# Patient Record
Sex: MALE | Race: WHITE | NOT HISPANIC OR LATINO | Employment: STUDENT | URBAN - METROPOLITAN AREA
[De-identification: names, ages, dates, MRNs, and addresses within clinical notes are randomized per-mention and may not be internally consistent; named-entity substitution may affect disease eponyms.]

---
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
## 2020-02-19 NOTE — PATIENT INSTRUCTIONS
1  Continue PT per protocol  2   Follow-up in 2 months with Dr Vyas Pi 1. Does the patient have a moderate to severe fever?  No  2. Has the patient had a serious reaction to a flu shot before?   No  3. Has the patient ever had Guillian [place]field Syndrome within 6 weeks of a previous flu shot?  No  4. Is the patient less than 6 months of age?  No    Patient is eligible to receive the vaccine based on all questions being answered as 'No'.Recent PHQ 2/9 Score    PHQ 2:  Date Adult PHQ 2 Score   2/19/2020 1       PHQ 9:  Date Adult PHQ 9 Score   2/19/2020 4     Over the last 2 weeks, how often have you been bothered by the following problems?          PHQ2 Score:  1  1. Little interest or pleasure in doing things?:  1  2. Feeling down, depressed, or hopeless?:  0     PHQ9 Score:  4  3. Trouble falling, staying asleep or sleeping too much?:  2  4. Feeling tired or having little energy?:  1  5. Poor appetite or overeating?:  0  6. Feeling bad about yourself - or that you are a failure or that you have let yourself or your family down?:  0  7. Trouble concentrating on things such as reading a newspaper or watching television?:  0  8. Moving or speaking so slowly that other people could have noticed? Or the opposite - being so fidgety or restless that you were moving around a lot more than usual?:  0  9. Thoughts that you would be better off dead, or of hurting yourself in some way?:  0

## 2020-02-20 ENCOUNTER — EVALUATION (OUTPATIENT)
Dept: PHYSICAL THERAPY | Facility: REHABILITATION | Age: 20
End: 2020-02-20
Payer: COMMERCIAL

## 2020-02-20 DIAGNOSIS — M25.821 OLECRANON IMPINGEMENT SYNDROME OF RIGHT ELBOW: Primary | ICD-10-CM

## 2020-02-20 PROCEDURE — 97140 MANUAL THERAPY 1/> REGIONS: CPT | Performed by: PHYSICAL THERAPIST

## 2020-02-20 PROCEDURE — 97161 PT EVAL LOW COMPLEX 20 MIN: CPT | Performed by: PHYSICAL THERAPIST

## 2020-02-20 NOTE — LETTER
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        Dear Dr Shaina Cantu:    Thank you for your recent referral of James Saab  Please review the attached evaluation summary from Zain's recent visit  Please verify that you agree with the plan of care by signing the attached order  If you have any questions or concerns, please do not hesitate to call  I sincerely appreciate the opportunity to share in the care of one of your patients and hope to have another opportunity to work with you in the near future  Sincerely,    Beulah Marino, PT      Referring Provider:      I certify that I have read the below Plan of Care and certify the need for these services furnished under this plan of treatment while under my care  Alexis Rausch MD  [address]  [address]: [phone]          PT Evaluation     Today's date: 2020  Patient name: Stu Interiano  : 2000  MRN: [id number]  Referring provider: Romaine Warren MD  Dx:   Encounter Diagnosis     ICD-10-CM    1  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        Start Time: 1805  Stop Time: 1900  Total time in clinic (min): 55 minutes    Assessment  Assessment details: Stu Interiano is a 23 y o  male presenting to outpatient physical therapy on 20 with referral from MD for right posterior elbow impingement felt when throwing (release phase)  Upon evaluation, James Marmolejo demonstrates impaired pronator teres tone and TTP along with weakness in muscle, especially when tested overhead  The listed impairments and functional limitation are effecting James Marmolejo ability to function at prior level   They can continue to benefit from physical therapy services at this times in order to address the above discussed impairments and functional limitation in order to allow for a return to premorbid status  Provided HEP with pronator strengthening at this time  Will attempt throwing in ~ 10 days  Impairments: abnormal muscle firing, abnormal muscle tone, activity intolerance, impaired physical strength and pain with function  Understanding of Dx/Px/POC: good   Prognosis: good    Plan  Patient would benefit from: skilled PT  Planned modality interventions: thermotherapy: hydrocollator packs  Planned therapy interventions: joint mobilization, manual therapy, neuromuscular re-education, home exercise program, therapeutic exercise, therapeutic activities, strengthening, patient education, functional ROM exercises and gait training  Frequency: 2x week  Duration in weeks: 8  Treatment plan discussed with: patient        Subjective Evaluation    History of Present Illness  Mechanism of injury: Nish Rivera is a 23 y o  male presenting to physical therapy on 20 with referral from MD for right elbow pain and impingement that began a year ago when he began to throw at the beginning of his high school season  He reports that his pain worsened over the season, took time off over the summer from throwing  He threw some in the fall, could throw about 30-50 before it began to hurt but as of 1-2 weeks ago, after 3-5 throws he feels significant sharp pain in his elbow  AROM with ADLs is normal, c/o no pain  Will get some tingling in ulnar nerve distribution - random but is usually after throwing  Pain is felt at release phase, reports feeling a catch  MRI was performed in January - negative for pathology  Follow up was with [facility] who administered an oral steroid beginning on 2020       No throwing for 14 days -         Pain  Current pain ratin  At best pain ratin  At worst pain rating: 10  Location: right posterior elbow  Quality: sharp      Diagnostic Tests  X-ray: normal  MRI studies: normal  Treatments  Previous treatment: medication (oral steroid)  Patient Goals  Patient goals for therapy: decreased pain  Patient goal: throw pain free    STG:  Pt  Will be independent with HEP  Pt  Will improve pronator teres strength to 4+/5 on right  Pt  Will have no TTP over pronator teres    LTG:  Pt  Will improve FOTO to goal  Pt  Will throw 20 pitches pain free w/o elbow pain    Objective     Dermatome: (pinprick- L/R): Normal UE       Reflexes:  (L/R) C5-6: 2+     C7:    2+             Scapular position:  Abduction Syndrome:  Inferior angle on R          Palpation: TTP pronator teres         MMT         AROM          PROM    Shoulder       L       R        L           R      L     R   Flex  Abd           IR          ER          Pronator teres 5 3+(3/5 overhead)       Rhomboid         Mid Trap 4 3+       Low Trap 4 3+       Serratus         Infraspnatus         Teres Major         Sub Scap            + median nerve ANTT on R (ROM)   Varus/valgus stress test: normal  Segmental mobility: UHJ - normal  RHJ - normal      Flowsheet Rows      Most Recent Value   PT/OT G-Codes   Current Score  59   Projected Score  70             Precautions: Left labral repair      Manual  2/21            STM pronator teres 5'            Education 5'                                                       Exercise Diary                                                                                                                                                                                                                                                                                      Modalities

## 2020-02-20 NOTE — LETTER
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        Dear Dr Joseph Risk:    Thank you for your recent referral of Yareli Saab  Please review the attached evaluation summary from Zain's recent visit  Please verify that you agree with the plan of care by signing the attached order  If you have any questions or concerns, please do not hesitate to call  I sincerely appreciate the opportunity to share in the care of one of your patients and hope to have another opportunity to work with you in the near future  Sincerely,    Annemarie Wood, PT      Referring Provider:      I certify that I have read the below Plan of Care and certify the need for these services furnished under this plan of treatment while under my care  MD [address] [facility]: [phone]          PT Evaluation     Today's date: 2020  Patient name: Christine Fontana  : 2000  MRN: [id number]  Referring provider: Parmjit Gonzalez  Dx:   Encounter Diagnosis     ICD-10-CM    1  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        Start Time: 1805  Stop Time: 1900  Total time in clinic (min): 55 minutes    Assessment  Assessment details: Christine Fontana is a 23 y o  male presenting to outpatient physical therapy on 20 with referral from MD for right posterior elbow impingement felt when throwing (release phase)  Upon evaluation, Yareli Mercado demonstrates impaired pronator teres tone and TTP along with weakness in muscle, especially when tested overhead  The listed impairments and functional limitation are effecting Yareli Mercado ability to function at prior level   They can continue to benefit from physical therapy services at this times in order to address the above discussed impairments and functional limitation in order to allow for a return to premorbid status  Provided HEP with pronator strengthening at this time  Will attempt throwing in ~ 10 days  Impairments: abnormal muscle firing, abnormal muscle tone, activity intolerance, impaired physical strength and pain with function  Understanding of Dx/Px/POC: good   Prognosis: good    Plan  Patient would benefit from: skilled PT  Planned modality interventions: thermotherapy: hydrocollator packs  Planned therapy interventions: joint mobilization, manual therapy, neuromuscular re-education, home exercise program, therapeutic exercise, therapeutic activities, strengthening, patient education, functional ROM exercises and gait training  Frequency: 2x week  Duration in weeks: 8  Treatment plan discussed with: patient        Subjective Evaluation    History of Present Illness  Mechanism of injury: Nish Rivera is a 23 y o  male presenting to physical therapy on 20 with referral from MD for right elbow pain and impingement that began a year ago when he began to throw at the beginning of his high school season  He reports that his pain worsened over the season, took time off over the summer from throwing  He threw some in the fall, could throw about 30-50 before it began to hurt but as of 1-2 weeks ago, after 3-5 throws he feels significant sharp pain in his elbow  AROM with ADLs is normal, c/o no pain  Will get some tingling in ulnar nerve distribution - random but is usually after throwing  Pain is felt at release phase, reports feeling a catch  MRI was performed in January - negative for pathology  Follow up was with [facility] who administered an oral steroid beginning on 2020       No throwing for 14 days -         Pain  Current pain ratin  At best pain ratin  At worst pain rating: 10  Location: right posterior elbow  Quality: sharp      Diagnostic Tests  X-ray: normal  MRI studies: normal  Treatments  Previous treatment: medication (oral steroid)  Patient Goals  Patient goals for therapy: decreased pain  Patient goal: throw pain free    STG:  Pt  Will be independent with HEP  Pt  Will improve pronator teres strength to 4+/5 on right  Pt  Will have no TTP over pronator teres    LTG:  Pt  Will improve FOTO to goal  Pt  Will throw 20 pitches pain free w/o elbow pain    Objective     Dermatome: (pinprick- L/R): Normal UE       Reflexes:  (L/R) C5-6: 2+     C7:    2+             Scapular position:  Abduction Syndrome:  Inferior angle on R          Palpation: TTP pronator teres         MMT         AROM          PROM    Shoulder       L       R        L           R      L     R   Flex  Abd           IR          ER          Pronator teres 5 3+(3/5 overhead)       Rhomboid         Mid Trap 4 3+       Low Trap 4 3+       Serratus         Infraspnatus         Teres Major         Sub Scap            + median nerve ANTT on R (ROM)   Varus/valgus stress test: normal  Segmental mobility: UHJ - normal  RHJ - normal      Flowsheet Rows      Most Recent Value   PT/OT G-Codes   Current Score  59   Projected Score  70             Precautions: Left labral repair      Manual  2/21            STM pronator teres 5'            Education 5'                                                       Exercise Diary                                                                                                                                                                                                                                                                                      Modalities

## 2020-02-20 NOTE — PROGRESS NOTES
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        Start Time: 1805  Stop Time: 1900  Total time in clinic (min): 55 minutes    Assessment  Assessment details: Dipti David is a 23 y o  male presenting to outpatient physical therapy on 20 with referral from MD for right posterior elbow impingement felt when throwing (release phase)  Upon evaluation, Niru Nguyễn demonstrates impaired pronator teres tone and TTP along with weakness in muscle, especially when tested overhead  The listed impairments and functional limitation are effecting Niru Nguyễn ability to function at prior level  They can continue to benefit from physical therapy services at this times in order to address the above discussed impairments and functional limitation in order to allow for a return to premorbid status  Provided HEP with pronator strengthening at this time  Will attempt throwing in ~ 10 days       Impairments: abnormal muscle firing, abnormal muscle tone, activity intolerance, impaired physical strength and pain with function  Understanding of Dx/Px/POC: good   Prognosis: good    Plan  Patient would benefit from: skilled PT  Planned modality interventions: thermotherapy: hydrocollator packs  Planned therapy interventions: joint mobilization, manual therapy, neuromuscular re-education, home exercise program, therapeutic exercise, therapeutic activities, strengthening, patient education, functional ROM exercises and gait training  Frequency: 2x week  Duration in weeks: 8  Treatment plan discussed with: patient        Subjective Evaluation    History of Present Illness  Mechanism of injury: Niru Nguyễn is a 23 y o  male presenting to physical therapy on 20 with referral from MD for right elbow pain and impingement that began a year ago when he began to throw at the beginning of his high school season  He reports that his pain worsened over the season, took time off over the summer from throwing  He threw some in the fall, could throw about 30-50 before it began to hurt but as of 1-2 weeks ago, after 3-5 throws he feels significant sharp pain in his elbow  AROM with ADLs is normal, c/o no pain  Will get some tingling in ulnar nerve distribution - random but is usually after throwing  Pain is felt at release phase, reports feeling a catch  MRI was performed in January - negative for pathology  Follow up was with [facility] who administered an oral steroid beginning on 2020  No throwing for 14 days -         Pain  Current pain ratin  At best pain ratin  At worst pain rating: 10  Location: right posterior elbow  Quality: sharp      Diagnostic Tests  X-ray: normal  MRI studies: normal  Treatments  Previous treatment: medication (oral steroid)  Patient Goals  Patient goals for therapy: decreased pain  Patient goal: throw pain free    STG:  Pt  Will be independent with HEP  Pt  Will improve pronator teres strength to 4+/5 on right  Pt  Will have no TTP over pronator teres    LTG:  Pt  Will improve FOTO to goal  Pt  Will throw 20 pitches pain free w/o elbow pain    Objective     Dermatome: (pinprick- L/R): Normal UE       Reflexes:  (L/R) C5-6: 2+     C7:    2+             Scapular position:  Abduction Syndrome:  Inferior angle on R          Palpation: TTP pronator teres         MMT         AROM          PROM    Shoulder       L       R        L           R      L     R   Flex  Abd           IR          ER          Pronator teres 5 3+(3/5 overhead)       Rhomboid         Mid Trap 4 3+       Low Trap 4 3+       Serratus         Infraspnatus         Teres Major         Sub Scap            + median nerve ANTT on R (ROM)   Varus/valgus stress test: normal  Segmental mobility: UHJ - normal  RHJ - normal      Flowsheet Rows Most Recent Value   PT/OT G-Codes   Current Score  59   Projected Score  70             Precautions: Left labral repair      Manual  2/21            STM pronator teres 5'            Education 5'                                                       Exercise Diary                                                                                                                                                                                                                                                                                      Modalities

## 2020-02-21 ENCOUNTER — TRANSCRIBE ORDERS (OUTPATIENT)
Dept: PHYSICAL THERAPY | Facility: REHABILITATION | Age: 20
End: 2020-02-21

## 2020-02-21 DIAGNOSIS — M25.821 OLECRANON IMPINGEMENT SYNDROME OF RIGHT ELBOW: Primary | ICD-10-CM

## 2020-02-24 ENCOUNTER — OFFICE VISIT (OUTPATIENT)
Dept: PHYSICAL THERAPY | Facility: REHABILITATION | Age: 20
End: 2020-02-24
Payer: COMMERCIAL

## 2020-02-24 ENCOUNTER — APPOINTMENT (OUTPATIENT)
Dept: PHYSICAL THERAPY | Facility: REHABILITATION | Age: 20
End: 2020-02-24
Payer: COMMERCIAL

## 2020-02-24 DIAGNOSIS — M25.821 OLECRANON IMPINGEMENT SYNDROME OF RIGHT ELBOW: Primary | ICD-10-CM

## 2020-02-24 PROCEDURE — 97112 NEUROMUSCULAR REEDUCATION: CPT | Performed by: PHYSICAL THERAPIST

## 2020-02-24 PROCEDURE — 97140 MANUAL THERAPY 1/> REGIONS: CPT | Performed by: PHYSICAL THERAPIST

## 2020-02-24 NOTE — PROGRESS NOTES
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        Start Time: 1630  Stop Time: 1715  Total time in clinic (min): 45 minutes    Subjective: Pt  Offers no new complaints, Plans to throw   Reports HEP compliance       Objective: See treatment diary below  4/5 MT MMT  3+/5 LT MMT  4+/5 pronator teres (4/5 overhead)    Assessment: Tolerated treatment well  Patient with improved pronation strength vs evaluation with improved TTP along muscle belly  Added tricep stretch to HEP 2/2 tightness on R vs L as well as low row into low row + arc movement to engage LT throughout full shoulder range  Plan: Continue per plan of care        Precautions: Left labral repair      Manual              STM pronator teres 5'            Education 5'            Cupping ant forearm and bicep 5'            TS G5 PA-T2-6 AB                             Exercise Diary              Prone T 10"x10            Low row XS3#  5'            Low row + arc XS 3#  3'            Pronation strengthening 5 sets elbow bent  5 sets elbow straight            Self STM tricep 2'            Triceps stretch @ wall 20"x4                                                                                                                                                                                                      Modalities

## 2020-02-26 ENCOUNTER — OFFICE VISIT (OUTPATIENT)
Dept: PHYSICAL THERAPY | Facility: REHABILITATION | Age: 20
End: 2020-02-26
Payer: COMMERCIAL

## 2020-02-26 DIAGNOSIS — M25.821 OLECRANON IMPINGEMENT SYNDROME OF RIGHT ELBOW: Primary | ICD-10-CM

## 2020-02-26 PROCEDURE — 97140 MANUAL THERAPY 1/> REGIONS: CPT | Performed by: PHYSICAL THERAPIST

## 2020-02-26 PROCEDURE — 97112 NEUROMUSCULAR REEDUCATION: CPT | Performed by: PHYSICAL THERAPIST

## 2020-02-26 PROCEDURE — 97110 THERAPEUTIC EXERCISES: CPT | Performed by: PHYSICAL THERAPIST

## 2020-02-27 NOTE — PROGRESS NOTES
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        Start Time: 1645  Stop Time: 1730  Total time in clinic (min): 45 minutes    Subjective: Pt  Offers no new complaints, reports no pain with throwing today (20 throws)      Objective: See treatment diary below  Pronator teres MMT 5/5, 4+/5 overhead    Assessment: Tolerated treatment well  Pain free baseball throws and improving strength in pronators as above  No TTP noted with palpation  Improving with LT engagement as her works strengthening overhead but as I increased resistance he was unable to maintain posterior scapular tilt  Plan: Continue per plan of care        Precautions: Left labral repair      Manual              STM pronator teres 5'            Education 5'            Cupping ant forearm and bicep NP            TS G5 PA-T2-6                              Exercise Diary                           Throwing 10'            Low row + arc XS 3#  2x10 90,2x10 full range            Pronation strengthening HEP            Self STM tricep 2'            Triceps stretch @ wall 20"x4            TS mobility 2'            Side lying TS mobility (floor ) 2'            Y lift off at wall 2x10                                                                                                                                                               Modalities

## 2020-03-02 ENCOUNTER — OFFICE VISIT (OUTPATIENT)
Dept: PHYSICAL THERAPY | Facility: REHABILITATION | Age: 20
End: 2020-03-02
Payer: COMMERCIAL

## 2020-03-02 DIAGNOSIS — M25.821 OLECRANON IMPINGEMENT SYNDROME OF RIGHT ELBOW: Primary | ICD-10-CM

## 2020-03-02 PROCEDURE — 97140 MANUAL THERAPY 1/> REGIONS: CPT | Performed by: PHYSICAL THERAPIST

## 2020-03-02 NOTE — PROGRESS NOTES
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        Start Time: 1430  Stop Time: 1500  Total time in clinic (min): 30 minutes    Subjective: Pt  Report feeling very good, threw 20 pitches Saturday without pain  Objective: See treatment diary below      Assessment: Tolerated treatment well  No TTP over pronator teres proximally  Strength continues to improve  Due to throwing today, educated to perform pronator strengthening and scap strengthening tonight after practice  Plan: Continue per plan of care        Precautions: Left labral repair      Manual  3/2            STM pronator teres 10',cupping 5' and tricep x 5'            Education 5'            Cupping ant forearm and bicep             TS G5 PA-T2-6                              Exercise Diary  3/2                         Throwing             Low row + arc             Pronation strengthening HEP            Self STM tricep 2'            Triceps stretch @ wall             TS mobility             Side lying TS mobility (floor )             Y lift off at wall                                                                                                                                                                Modalities

## 2020-03-05 ENCOUNTER — OFFICE VISIT (OUTPATIENT)
Dept: PHYSICAL THERAPY | Facility: REHABILITATION | Age: 20
End: 2020-03-05
Payer: COMMERCIAL

## 2020-03-05 DIAGNOSIS — M25.821 OLECRANON IMPINGEMENT SYNDROME OF RIGHT ELBOW: Primary | ICD-10-CM

## 2020-03-05 PROCEDURE — 97112 NEUROMUSCULAR REEDUCATION: CPT | Performed by: PHYSICAL THERAPIST

## 2020-03-05 PROCEDURE — 97140 MANUAL THERAPY 1/> REGIONS: CPT | Performed by: PHYSICAL THERAPIST

## 2020-03-06 NOTE — PROGRESS NOTES
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        Start Time: 1615  Stop Time: 1700  Total time in clinic (min): 45 minutes    Subjective: Pt  Report feeling very good, throw off mound yesterday for the first time, reported no pain  Threw all fastball's  Objective: See treatment diary below      Assessment: Tolerated treatment well  No TTP over pronators  Pain free throwing (20 @ 60 ft at reported 70% max)  Pt does continue to have some weakness with full overhead scap strengthening (Y) evident upon decreased ROM into range  Plan: Continue per plan of care        Precautions: Left labral repair      Manual  3/            Assessment 3'            Education             Cupping tricep 7'            TS G5 PA-T2-6                              Exercise Diary  3/5                         Throwing 15'            Low row + arc             Pronation strengthening overhed  MTB  3x fatigue            Self STM tricep 2'            Triceps stretch @ wall 2'            TS mobility             Side lying TS mobility (floor )             Y lift off at wall                                                                                                                                                                Modalities

## 2020-03-13 ENCOUNTER — TRANSCRIBE ORDERS (OUTPATIENT)
Dept: PHYSICAL THERAPY | Facility: REHABILITATION | Age: 20
End: 2020-03-13

## 2020-03-13 DIAGNOSIS — M25.821 OLECRANON IMPINGEMENT SYNDROME OF RIGHT ELBOW: Primary | ICD-10-CM

## 2020-04-13 NOTE — PROGRESS NOTES
Nissa Simon has attended a total of 5 physical therapy appointments to date  Patient was last treated on 3/5/2020 and has no remaining appointments scheduled  Goals, objective and subjective information unable to be updated at this time  Patient will be discharged from physical therapy secondary to noncompliance with plan of care  Last I spoke with the patient prior to quarantine due ot [place] -19 pandemic, he had been doing well, throwing pain free

## 2021-08-24 ENCOUNTER — OFFICE VISIT (OUTPATIENT)
Dept: FAMILY MEDICINE CLINIC | Facility: CLINIC | Age: 21
End: 2021-08-24
Payer: COMMERCIAL

## 2021-08-24 VITALS
HEIGHT: 78 IN | DIASTOLIC BLOOD PRESSURE: 70 MMHG | SYSTOLIC BLOOD PRESSURE: 120 MMHG | HEART RATE: 76 BPM | TEMPERATURE: 97.4 F | BODY MASS INDEX: 23.37 KG/M2 | WEIGHT: 202 LBS | RESPIRATION RATE: 16 BRPM | OXYGEN SATURATION: 97 %

## 2021-08-24 DIAGNOSIS — F41.9 ANXIETY: Primary | ICD-10-CM

## 2021-08-24 PROCEDURE — 3725F SCREEN DEPRESSION PERFORMED: CPT | Performed by: FAMILY MEDICINE

## 2021-08-24 PROCEDURE — 99203 OFFICE O/P NEW LOW 30 MIN: CPT | Performed by: FAMILY MEDICINE

## 2021-08-24 PROCEDURE — 1036F TOBACCO NON-USER: CPT | Performed by: FAMILY MEDICINE

## 2021-08-24 PROCEDURE — 3008F BODY MASS INDEX DOCD: CPT | Performed by: FAMILY MEDICINE

## 2021-08-24 NOTE — ASSESSMENT & PLAN NOTE
Anxiety  Patient feels he does not require medication at this time for anxiety  He will call if he develops any recurrence of previous symptoms

## 2021-08-24 NOTE — PROGRESS NOTES
FAMILY PRACTICE OFFICE VISIT       NAME: Zain Saab  AGE: 21 y o  SEX: male       : 2000        MRN: [id number]    DATE: 2021  TIME: 10:29 AM    Assessment and Plan     Problem List Items Addressed This Visit        Other    Anxiety - Primary      Anxiety  Patient feels he does not require medication at this time for anxiety  He will call if he develops any recurrence of previous symptoms  Overall the patient appears to be in stable health  He will obtain immunization records to review  His COVID vaccination is up-to-date      Chief Complaint     Chief Complaint   Patient presents with   [facility] Patient Visit     Pt presents to establish care    Physical Exam     Sports physical-  [facility]       History of Present Illness       Patient in the office to establish with new PCP  He denies any recent illness  He received his COVID vaccination at a local [facility] pharmacy  He is a humberto at [employer]  He does exercise on a regular basis and  Is a pitcher on the baseball team    His mother is attempting to obtain immunization records from his childhood  Patient does not take any over-the-counter medications  He recently stopped his Lexapro medicine 2-3 weeks ago  He would like to try to go without any medication and did not have any withdrawal symptoms as of yet      Review of Systems   Review of Systems   Constitutional: Negative  HENT: Negative  Eyes: Negative  Respiratory: Negative  Cardiovascular: Negative  Gastrointestinal: Negative  Endocrine: Negative  Genitourinary: Negative  Musculoskeletal: Negative  Skin: Negative  Allergic/Immunologic: Negative  Neurological: Negative  Hematological: Negative  Psychiatric/Behavioral: Negative          Active Problem List     Patient Active Problem List   Diagnosis    Olecranon impingement syndrome of right elbow    Anxiety       Past Medical History:  Past Medical History:   Diagnosis Date    Anxiety     Fractures     stress fx of Right foot and toe fx  : Pinkie finger right FX       Past Surgical History:  Past Surgical History:   Procedure Laterality Date    CYST REMOVAL      upper Gum     FL INJECTION RIGHT ELBOW (ARTHROGRAM)  12/16/2019    ND SHLDR ARTHROSCOP,SURG,CAPSULORRHAPHY Left 9/12/2018    Procedure: SHOULDER ARTHROSCOPY WITH LABRAL POSTERIOR AND ANTERIOR REPAIR;  Surgeon: Ángel Monahan MD;  Location: [facility];  Service: Orthopedics       Family History:  Family History   Problem Relation Age of Onset    Hypertension Mother     Heart attack Maternal Grandfather        Social History:  Social History     Socioeconomic History    Marital status: Single     Spouse name: Not on file    Number of children: Not on file    Years of education: Not on file    Highest education level: Not on file   Occupational History    Not on file   Tobacco Use    Smoking status: Never Smoker    Smokeless tobacco: Never Used   Vaping Use    Vaping Use: Never used   Substance and Sexual Activity    Alcohol use: No    Drug use: No    Sexual activity: Not Currently   Other Topics Concern    Not on file   Social History Narrative    Not on file     Social Determinants of Health     Financial Resource Strain:     Difficulty of Paying Living Expenses:    Food Insecurity:     Worried About Running Out of Food in the Last Year:     Ran Out of Food in the Last Year:    Transportation Needs:     Lack of Transportation (Medical):      Lack of Transportation (Non-Medical):    Physical Activity:     Days of Exercise per Week:     Minutes of Exercise per Session:    Stress:     Feeling of Stress :    Social Connections:     Frequency of Communication with Friends and Family:     Frequency of Social Gatherings with Friends and Family:     Attends Mormonism Services:     Active Member of Clubs or Organizations:     Attends Club or Organization Meetings:    Bibiana Jurado Marital Status:    Intimate Partner Violence:     Fear of Current or Ex-Partner:     Emotionally Abused:     Physically Abused:     Sexually Abused:        Objective     Vitals:    08/24/21 1017   BP: 120/70   Pulse:    Resp:    Temp:    SpO2:      Wt Readings from Last 3 Encounters:   08/24/21 91 6 kg (202 lb)   12/19/19 82 6 kg (182 lb) (84 %, Z= 0 99)*   12/16/19 82 1 kg (181 lb) (83 %, Z= 0 96)*     * Growth percentiles are based on CDC (Boys, 2-20 Years) data  Physical Exam  Constitutional:       General: He is not in acute distress  Appearance: Normal appearance  He is not ill-appearing  HENT:      Head: Normocephalic and atraumatic  Eyes:      General:         Right eye: No discharge  Left eye: No discharge  Extraocular Movements: Extraocular movements intact  Conjunctiva/sclera: Conjunctivae normal       Pupils: Pupils are equal, round, and reactive to light  Neck:      Vascular: No carotid bruit  Cardiovascular:      Rate and Rhythm: Normal rate and regular rhythm  Heart sounds: Normal heart sounds  No murmur heard  Pulmonary:      Effort: Pulmonary effort is normal       Breath sounds: Normal breath sounds  No wheezing, rhonchi or rales  Abdominal:      General: Abdomen is flat  Bowel sounds are normal  There is no distension  Palpations: Abdomen is soft  Tenderness: There is no abdominal tenderness  There is no guarding or rebound  Musculoskeletal:      Right lower leg: No edema  Left lower leg: No edema  Lymphadenopathy:      Cervical: No cervical adenopathy  Skin:     Findings: No rash  Neurological:      General: No focal deficit present  Mental Status: He is alert and oriented to person, place, and time  Cranial Nerves: No cranial nerve deficit  Psychiatric:         Mood and Affect: Mood normal          Behavior: Behavior normal          Thought Content:  Thought content normal          Judgment: Judgment normal  Pertinent Laboratory/Diagnostic Studies:  No results found for: GLUCOSE, BUN, CREATININE, CALCIUM, NA, K, CO2, CL  No results found for: ALT, AST, GGT, ALKPHOS, BILITOT    No results found for: WBC, HGB, HCT, MCV, PLT    No results found for: TSH    No results found for: CHOL  No results found for: TRIG  No results found for: HDL  No results found for: LDLCALC  No results found for: HGBA1C    Results for orders placed or performed in visit on 04/26/21   Novel Coronavirus (COVID-19), PCR [facility]    Specimen: Nose; Nares   Result Value Ref Range    SARS-CoV-2 Negative Negative       No orders of the defined types were placed in this encounter  ALLERGIES:  No Known Allergies    Current Medications     No current outpatient medications on file  No current facility-administered medications for this visit  Health Maintenance     Health Maintenance   Topic Date Due    Hepatitis C Screening  Never done    DTaP,Tdap,and Td Vaccines (1 - Tdap) Never done    HPV Vaccine (1 - Male 2-dose series) Never done    COVID-19 Vaccine (1) Never done    HIV Screening  Never done    BMI: Adult  Never done    Annual Physical  Never done    Influenza Vaccine (1) 09/01/2021    Depression Screening PHQ  08/24/2022    Pneumococcal Vaccine: Pediatrics (0 to 5 Years) and At-Risk Patients (6 to 59 Years)  Aged Out    HIB Vaccine  Aged Out    Hepatitis B Vaccine  Aged Out    IPV Vaccine  Aged Out    Hepatitis A Vaccine  Aged Out    Meningococcal ACWY Vaccine  Aged Out       There is no immunization history on file for this patient      Amy Yeung MD

## 2021-09-10 ENCOUNTER — OFFICE VISIT (OUTPATIENT)
Dept: PHYSICAL THERAPY | Facility: REHABILITATION | Age: 21
End: 2021-09-10
Payer: COMMERCIAL

## 2021-09-10 DIAGNOSIS — M75.41 SUBACROMIAL IMPINGEMENT, RIGHT: Primary | ICD-10-CM

## 2021-09-10 PROCEDURE — 97162 PT EVAL MOD COMPLEX 30 MIN: CPT | Performed by: PHYSICAL THERAPIST

## 2021-09-10 NOTE — PROGRESS NOTES
Addended by: ROMEL AVILA on: 7/23/2018 03:52 PM     Modules accepted: Orders     PT Evaluation     Today's date: 9/10/2021  Patient name: Rm Zelaya  : 2000  MRN: [id number]  Referring provider: Robert Browning PT  Dx:   Encounter Diagnosis     ICD-10-CM    1  Subacromial impingement, right  M75 [id number]        Start Time: 915  Stop Time:   Total time in clinic (min): 45 minutes    Assessment  Assessment details: Rm Zelaya is a 21 y o  male presenting to outpatient physical therapy on 09/10/21 with referral from [facility] [address] [address] for chronic R sided posterior shoulder pain that began at the end of , gradually with baseball throwing  Upon evaluation, Ruth Sanchez demonstrates impaired shoulder strength and movement as signs and symptoms are consistent with subacromial impingement  His RC strength testing and and scap testing were weak and painful and labral testing was also positive for pain  I feel at this point his shoulder is moderate to highly irritable as to why all tests were positive  He does have + radial nerve ANTT which fits his symptom pattern along with significant tone changes and TTP along posterior RC  The listed impairments and functional limitation are effecting Ruth Sanchez ability to function at prior level  They can continue to benefit from physical therapy services at this times in order to address the above discussed impairments and functional limitation in order to allow for a return to premorbid status  Will treat via DA over the next 30 days  If not better, will refer to MD for consult      HEP: self STM post cuff and forearm, radial nerve glides    Impairments: abnormal muscle firing, abnormal muscle tone, abnormal or restricted ROM, abnormal movement, activity intolerance, impaired physical strength and pain with function  Understanding of Dx/Px/POC: good   Prognosis: good    Plan  Patient would benefit from: skilled PT  Planned modality interventions: thermotherapy: hydrocollator packs  Planned therapy interventions: joint mobilization, manual therapy, ADL training, neuromuscular re-education, home exercise program, therapeutic exercise, therapeutic activities, strengthening, patient education and functional ROM exercises  Frequency: 2x week  Duration in weeks: 4  Plan of Care beginning date: 9/10/2021  Plan of Care expiration date: 10/8/2021  Treatment plan discussed with: patient        Subjective Evaluation    History of Present Illness  Mechanism of injury: Shanti Hudson is a 21 y o  male presenting to physical therapy on 09/10/21 with referral from [facility] for right sided posterior shoulder pain  Recurrent probem    Quality of life: good    Pain  Current pain ratin  At worst pain ratin  Quality: sharp      Diagnostic Tests  No diagnostic tests performed  Treatments  No previous or current treatments  Patient Goals  Patient goals for therapy: decreased pain  Patient goal: return to throwing pain free, RUE use pain free, lifting pain free    Short Term Goals:   1  Patient will be Independent with hep  2  Patient will improve pain with activity by 50%  3  Patient will improve R RC ER MMT to 5/5 - pain free  4  Patient will improve MT/LT MMT to 5/5 - pain free  5  Patient will have pain free shoulder AROM and PROM    Long Term Goals:   1  Patient will improve FOTO to greater then goal  2  Patient will improve pain with activity to 2/10 or less  3  Patient will continue with HEP independence to allow for decreased future reoccurrence of pain and loss in function  4  Patient will return to throwing pain free  5  Patient will have push/pull test per UE standards  6  Patient will have UE closed kinetic chain test per standard        Objective    Dermatome: (pinprick- L/R):   NT      Reflexes:  NT             Palpation: TTP and tone increase R posterior shoulder    CS unremarkable - AROM and segmental mobility assessment         MMT         AROM          PROM    Shoulder       L       R        L           R      L     R   Flex  ERP   Abd  IR       ERP (20)   ER       ERP (90)            Rhomboid         Mid Trap  3       Low Trap  3       Serratus         Infraspnatus  3+ P       Teres Minor (90/90)  NT       Sub Scap  3+ P           Rotator Cuff Testing:  ER Lag: negative   Drop Arm: negative   Arc Sign: positive    Belly Press: positive      Labral Testing:    Bicep Load: positive  Pronated Load: positive  Bamberg Test: positive      Neuro Dynamic Testing:  Median Nerve: L=  neg  R=neg  Ulnar Nerve: L= NT   R=  NT    Radial Nerve: L= neg   R=    positive     ANTT findings:     Segmental mobility: GHJ:  normal             Precautions: standard      Manuals 9/10            STM posterior RC R            Cupping posterior RC 5'                                      Neuro Re-Ed             Self STM post cuff             Self STM forearm             MT/LT re-ed             RC ER re-ed             Radial nerve glides                                       Ther Ex                                                                                                                     Ther Activity                                       Gait Training                                       Modalities

## 2021-09-22 ENCOUNTER — APPOINTMENT (OUTPATIENT)
Dept: RADIOLOGY | Facility: CLINIC | Age: 21
End: 2021-09-22
Payer: COMMERCIAL

## 2021-09-22 ENCOUNTER — OFFICE VISIT (OUTPATIENT)
Dept: OBGYN CLINIC | Facility: CLINIC | Age: 21
End: 2021-09-22
Payer: COMMERCIAL

## 2021-09-22 ENCOUNTER — TELEPHONE (OUTPATIENT)
Dept: OBGYN CLINIC | Facility: CLINIC | Age: 21
End: 2021-09-22

## 2021-09-22 VITALS
HEART RATE: 76 BPM | DIASTOLIC BLOOD PRESSURE: 78 MMHG | BODY MASS INDEX: 24.21 KG/M2 | HEIGHT: 77 IN | WEIGHT: 205 LBS | SYSTOLIC BLOOD PRESSURE: 120 MMHG

## 2021-09-22 DIAGNOSIS — S49.91XA INJURY OF RIGHT SHOULDER, INITIAL ENCOUNTER: ICD-10-CM

## 2021-09-22 DIAGNOSIS — S49.91XA INJURY OF RIGHT SHOULDER, INITIAL ENCOUNTER: Primary | ICD-10-CM

## 2021-09-22 PROCEDURE — 99214 OFFICE O/P EST MOD 30 MIN: CPT | Performed by: FAMILY MEDICINE

## 2021-09-22 PROCEDURE — 73030 X-RAY EXAM OF SHOULDER: CPT

## 2021-09-22 NOTE — PROGRESS NOTES
1  Injury of right shoulder, initial encounter  XR shoulder 2+ vw right    MRI arthrogram right shoulder     Orders Placed This Encounter   Procedures    XR shoulder 2+ vw right    MRI arthrogram right shoulder        IMAGING STUDIES: (I personally reviewed images in PACS and report):  X-ray right shoulder 09/22/2021:  No acute abnormality        PAST REPORTS:        ASSESSMENT/PLAN:  Chronic right shoulder pain  Differential diagnosis:  Labral tear    Repeat X-ray next visit: None    Return for F/U after MRI is completed for review  Patient Instructions   Explained to patient that his examination today is concerning for labral tear of the right shoulder  Recommended MRI arthrogram evaluation  I also recommended physical therapy pinching evaluation for technique  I recommend against pinching now  Patient may designated hit and pinch it  He may perform physical therapy exercises in anticipation of right shoulder labral tear including sleeper stretch and cuff exercises  __________________________________________________________________________    CHIEF COMPLAINT:  Right shoulder injury    HPI:  Tyesha Harrell is a 21 y o  male  who presents for       Visit 09/22/2021:  Evaluation of right shoulder pain  Patient points lateral aspect of the shoulder screws 1st was pain  Does have radiation down towards the elbow  Sore throbbing exacerbated by throwing  Does have some soreness throughout the day  Mild to moderate intensity  Has seen a physical therapist   Was advised to perform stretches then return to throwing within 1 week which worsened his pain + numbness right hand index and long finger when throwing  Ongoing for approximately 1 year  Soft gautam eyelids  Janett Side since 15years old  Review of Systems   Constitutional: Negative for chills and fever  HENT: Negative for hearing loss  Eyes: Negative for visual disturbance  Respiratory: Negative for shortness of breath  Cardiovascular: Negative for chest pain  Gastrointestinal: Negative for abdominal pain  Genitourinary: Negative for difficulty urinating and dysuria  Neurological: Positive for numbness  Negative for weakness  Psychiatric/Behavioral: Negative for suicidal ideas  Following history reviewed and update:    Past Medical History:   Diagnosis Date    Anxiety     Fractures     stress fx of Right foot and toe fx  : Pinkie finger right FX     Past Surgical History:   Procedure Laterality Date    CYST REMOVAL      upper Gum     FL INJECTION RIGHT ELBOW (ARTHROGRAM)  12/16/2019    DE SHLDR ARTHROSCOP,SURG,CAPSULORRHAPHY Left 9/12/2018    Procedure: SHOULDER ARTHROSCOPY WITH LABRAL POSTERIOR AND ANTERIOR REPAIR;  Surgeon: Rodrigo Lama MD;  [facility];  Service: Orthopedics     Social History   Social History     Substance and Sexual Activity   Alcohol Use No     Social History     Substance and Sexual Activity   Drug Use No     Social History     Tobacco Use   Smoking Status Never Smoker   Smokeless Tobacco Never Used     Family History   Problem Relation Age of Onset    Hypertension Mother     Heart attack Maternal Grandfather      No Known Allergies       Physical Exam  /78   Pulse 76   Ht 6' 5" (1 956 m)   Wt 93 kg (205 lb)   BMI 24 31 kg/m²     Constitutional:  see vital signs  Gen: well-developed, normocephalic/atraumatic, well-groomed  Eyes: No inflammation or discharge of conjunctiva or lids; sclera clear   Pharynx: no inflammation, lesion, or mass of lips  Neck: supple, no masses, non-distended  MSK: no inflammation, lesion, mass, or clubbing of nails and digits except for other than mentioned below  SKIN: no visible rashes or skin lesions  Pulmonary/Chest: Effort normal  No respiratory distress     NEURO: cranial nerves grossly intact  PSYCH:  Alert and oriented to person, place, and time; recent and remote memory intact; mood normal, no depression, anxiety, or agitation, judgment and insight good and intact     Ortho Exam  RIGHT SHOULDER:  Erythema: no  Swelling: no  Increased Warmth: no    Tenderness: none    ROM  Touchdown sign: intact  External Rotation: Intact  Internal Rotation: Intact    Strength  Abduction: 5/5  ER: 5/5  IR: 5/5    Drop-Arm: negative  Emptycan: +  Belly Press:   Lift-off Test:    Bhavik:+  Neer: +  Cross-Arm:   Speeds:     Internal Impingement:+  (crank position pain)    Labral Crank Test :+  (abducted 90, axial load, guided IR & Er)    Modified Labral Shift:+  (seated, ER, abduction, axial load, guided abd/add)    Sunderland's Test: ++  (FF 90, abd 15, resist thumbs up-, resist thumbs down+)    Apprehension:  Katy's Relocation Maneuver:    LEFT SHOULDER:  Strength  Abduction: 5/5  ER: 5/5  IR: 5/5    ROM  Touchdown sign: intact    Empty can: negative      __________________________________________________________________________  Procedures

## 2021-09-22 NOTE — PATIENT INSTRUCTIONS
Explained to patient that his examination today is concerning for labral tear of the right shoulder  Recommended MRI arthrogram evaluation  I also recommended physical therapy pinching evaluation for technique  I recommend against pinching now  Patient may designated hit and pinch it  He may perform physical therapy exercises in anticipation of right shoulder labral tear including sleeper stretch and cuff exercises

## 2021-09-22 NOTE — LETTER
September 22, 2021     Patient: Radhika Alvarado   YOB: 2000   Date of Visit: 9/22/2021       To Whom it May Concern:    Radhika Alvarado is under my professional care  He was seen in my office on 9/22/2021  I recommend against pinching now  Patient may designated hit and pinch it  If you have any questions or concerns, please don't hesitate to call           Sincerely,          Kerrie Shaffer III,         CC: Radhika Alvarado

## 2021-09-24 ENCOUNTER — HOSPITAL ENCOUNTER (OUTPATIENT)
Dept: MRI IMAGING | Facility: HOSPITAL | Age: 21
Discharge: HOME | End: 2021-09-24
Attending: FAMILY MEDICINE
Payer: COMMERCIAL

## 2021-09-24 ENCOUNTER — HOSPITAL ENCOUNTER (OUTPATIENT)
Dept: RADIOLOGY | Facility: HOSPITAL | Age: 21
Discharge: HOME | End: 2021-09-24
Attending: FAMILY MEDICINE | Admitting: FAMILY MEDICINE
Payer: COMMERCIAL

## 2021-09-24 DIAGNOSIS — S49.91XA UNSPECIFIED INJURY OF RIGHT SHOULDER AND UPPER ARM, INITIAL ENCOUNTER: ICD-10-CM

## 2021-09-24 DIAGNOSIS — S49.91XA INJURY OF RIGHT SHOULDER, INITIAL ENCOUNTER: ICD-10-CM

## 2021-09-24 PROCEDURE — 23350 INJECTION FOR SHOULDER X-RAY: CPT

## 2021-09-24 PROCEDURE — 73222 MRI JOINT UPR EXTREM W/DYE: CPT

## 2021-09-24 PROCEDURE — G1004 CDSM NDSC: HCPCS

## 2021-09-24 PROCEDURE — 77002 NEEDLE LOCALIZATION BY XRAY: CPT

## 2021-09-24 PROCEDURE — A9585 GADOBUTROL INJECTION: HCPCS | Performed by: FAMILY MEDICINE

## 2021-09-24 RX ORDER — LIDOCAINE HYDROCHLORIDE 10 MG/ML
30 INJECTION, SOLUTION EPIDURAL; INFILTRATION; INTRACAUDAL; PERINEURAL
Status: COMPLETED | OUTPATIENT
Start: 2021-09-24 | End: 2021-09-24

## 2021-09-24 RX ADMIN — GADOBUTROL 0.2 ML: 604.72 INJECTION INTRAVENOUS at 14:40

## 2021-09-24 RX ADMIN — IOHEXOL 3 ML: 300 INJECTION, SOLUTION INTRAVENOUS at 14:39

## 2021-09-24 RX ADMIN — LIDOCAINE HYDROCHLORIDE 6 ML: 10 INJECTION, SOLUTION EPIDURAL; INFILTRATION; INTRACAUDAL; PERINEURAL at 14:40

## 2021-09-27 ENCOUNTER — OFFICE VISIT (OUTPATIENT)
Dept: OBGYN CLINIC | Facility: CLINIC | Age: 21
End: 2021-09-27
Payer: COMMERCIAL

## 2021-09-27 ENCOUNTER — APPOINTMENT (OUTPATIENT)
Dept: RADIOLOGY | Facility: CLINIC | Age: 21
End: 2021-09-27
Payer: COMMERCIAL

## 2021-09-27 VITALS
SYSTOLIC BLOOD PRESSURE: 115 MMHG | BODY MASS INDEX: 24.21 KG/M2 | DIASTOLIC BLOOD PRESSURE: 62 MMHG | WEIGHT: 205 LBS | HEIGHT: 77 IN

## 2021-09-27 DIAGNOSIS — M25.521 PAIN IN RIGHT ELBOW: ICD-10-CM

## 2021-09-27 DIAGNOSIS — S49.91XA INJURY OF RIGHT SHOULDER, INITIAL ENCOUNTER: Primary | ICD-10-CM

## 2021-09-27 DIAGNOSIS — G56.31 RADIAL TUNNEL SYNDROME, RIGHT: ICD-10-CM

## 2021-09-27 PROCEDURE — 3008F BODY MASS INDEX DOCD: CPT | Performed by: FAMILY MEDICINE

## 2021-09-27 PROCEDURE — 1036F TOBACCO NON-USER: CPT | Performed by: FAMILY MEDICINE

## 2021-09-27 PROCEDURE — 99214 OFFICE O/P EST MOD 30 MIN: CPT | Performed by: FAMILY MEDICINE

## 2021-09-27 PROCEDURE — 73080 X-RAY EXAM OF ELBOW: CPT

## 2021-09-27 NOTE — PROGRESS NOTES
1  Injury of right shoulder, initial encounter  [facility]   2  Pain in right elbow  XR elbow 3+ vw right    EMG 2 Limb Upper Extremity   3  Radial tunnel syndrome, right  [facility]     Orders Placed This Encounter   Procedures    XR elbow 3+ vw right    [facility]    EMG 2 Limb Upper Extremity        IMAGING STUDIES: (I personally reviewed images in PACS and report): MRI  Right shoulder athrogram 9/24/21:   normal  Xray right shoulder 9/27/21: no acute abnormality    PAST REPORTS:        ASSESSMENT/PLAN:   · Right Radial Tunnel Syndrome  · Right Subacromial Impingement    Repeat X-ray next visit: None    Return for  follow-up in training room at [facility] in 2 weeks with physician  Patient Instructions   Recommended rest for pitching for 2-4 weeks  Start rehabilitation program for shoulder and elbow including targeted therapy for radial tunnel  EMG test ordered   Will reassess in training room 2 weeks        __________________________________________________________________________    CHIEF COMPLAINT:  Right shoulder pain  And right forearm pain    HPI:  Kell De Jesus is a 21 y o  male  who presents for       Visit  09/27/2021:   Here for follow-up evaluation right shoulder pain  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  Last visit did have examination concerning for labral tear; however, MRI arthrogram was normal       Pitched with pain through last year's season  Shoulder pain began this summer 2021  Today,Patient points to lateral shoulder as source of intermittent pain  Patient also points to the proximal forearm radial side as source of pain as well as occasional paresthesias  He also has tenderness at this site which reproduces chief complaint of pain  Patient denies any neck pain  Denies any pain radiating the neck down the arm  Review of Systems   Constitutional: Negative for chills and fever  Neurological: Negative for weakness  Following history reviewed and update:    Past Medical History:   Diagnosis Date    Anxiety     Fractures     stress fx of Right foot and toe fx  : Pinkie finger right FX     Past Surgical History:   Procedure Laterality Date    CYST REMOVAL      upper Gum     FL INJECTION RIGHT ELBOW (ARTHROGRAM)  12/16/2019    FL INJECTION RIGHT SHOULDER (ARTHROGRAM)  9/24/2021    MO SHLDR ARTHROSCOP,SURG,CAPSULORRHAPHY Left 9/12/2018    Procedure: SHOULDER ARTHROSCOPY WITH LABRAL POSTERIOR AND ANTERIOR REPAIR;  Surgeon: Rafaela Blackwood MD;  Location[facility];  Service: Orthopedics     Social History   Social History     Substance and Sexual Activity   Alcohol Use No     Social History     Substance and Sexual Activity   Drug Use No     Social History     Tobacco Use   Smoking Status Never Smoker   Smokeless Tobacco Never Used     Family History   Problem Relation Age of Onset    Hypertension Mother     Heart attack Maternal Grandfather      No Known Allergies       Physical Exam  /62   Ht 6' 5" (1 956 m)   Wt 93 kg (205 lb)   BMI 24 31 kg/m²     Constitutional:  see vital signs  Gen: well-developed, normocephalic/atraumatic, well-groomed  Eyes: No inflammation or discharge of conjunctiva or lids; sclera clear   Pharynx: no inflammation, lesion, or mass of lips  Neck: supple, no masses, non-distended  MSK: no inflammation, lesion, mass, or clubbing of nails and digits except for other than mentioned below  SKIN: no visible rashes or skin lesions  Pulmonary/Chest: Effort normal  No respiratory distress     NEURO: cranial nerves grossly intact  PSYCH:  Alert and oriented to person, place, and time; recent and remote memory intact; mood normal, no depression, anxiety, or agitation, judgment and insight good and intact     Ortho Exam  RIGHT SHOULDER:  Erythema: no  Swelling: no  Increased Warmth: no    Tenderness: + lateral subacromial    ROM  Touchdown sign: intact  External Rotation: Intact  Internal Rotation: Intact    Strength  Abduction: 5/5  ER: 5/5  IR: 5/5    Drop-Arm: negative  Emptycan: negative  Belly Press:   Lift-off Test:    Gutierres: negative  Neer: negative  Cross-Arm:   Speeds:     Internal Impingement:+  (crank position pain)    Labral Crank Test : negative  (abducted 90, axial load, guided IR & Er)    Modified Labral Shift: negative  (seated, ER, abduction, axial load, guided abd/add)    Brevard's Test: +  (FF 90, abd 15, resist thumbs up-, resist thumbs down+)    Apprehension:  Katy's Relocation Maneuver:    LEFT SHOULDER:  Strength  Abduction: 5/5  ER: 5/5  IR: 5/5    ROM  Touchdown sign: intact    Empty can: negative     Right Elbow:  no swelling, erythema, or increased warmth  rom full  nontender  no laxity of joint  Cubital tunnel Tinel's test:  Distal Biceps Hook test:    Right Wrist and forearm  no swelling, erythema, or increased warmth  rom full  + tenderness proximal forearm over muscular mass distal to the lateral epicondyle    No lateral epicondylar tenderness  no laxity of joint; druj stable  Carpal tunnel compression test:  Phalen's test:  Tinel's carpal tunnel test:    Right Hand  no erythema  Swelling: non  Tenderness: non  rom fingers mcp, pip, dip intact without pain  No digital scissoring or deviation of fingers  no extensor lag  no rotation of fingers  no joint laxity  strenght flexion and extension mcp, pip, dip 5/5 with pain reproduced in proximal form on resisted isometric contraction of finger extension  sensation intact  capillary refill intact   Froment sign:  normal  OK sign:  Normal  Thumb extension:  5/5     __________________________________________________________________________  Procedures

## 2021-09-27 NOTE — PATIENT INSTRUCTIONS
Recommended rest for pitching for 2-4 weeks  Start rehabilitation program for shoulder and elbow including targeted therapy for radial tunnel  EMG test ordered   Will reassess in training room 2 weeks

## 2021-10-05 ENCOUNTER — APPOINTMENT (OUTPATIENT)
Dept: PHYSICAL THERAPY | Facility: REHABILITATION | Age: 21
End: 2021-10-05
Payer: COMMERCIAL

## 2021-10-05 ENCOUNTER — OFFICE VISIT (OUTPATIENT)
Dept: PHYSICAL THERAPY | Facility: REHABILITATION | Age: 21
End: 2021-10-05
Payer: COMMERCIAL

## 2021-10-05 DIAGNOSIS — M75.41 SUBACROMIAL IMPINGEMENT, RIGHT: Primary | ICD-10-CM

## 2021-10-05 PROCEDURE — 97140 MANUAL THERAPY 1/> REGIONS: CPT | Performed by: PHYSICAL THERAPIST

## 2021-10-05 PROCEDURE — 97112 NEUROMUSCULAR REEDUCATION: CPT | Performed by: PHYSICAL THERAPIST

## 2021-10-12 ENCOUNTER — OFFICE VISIT (OUTPATIENT)
Dept: PHYSICAL THERAPY | Facility: REHABILITATION | Age: 21
End: 2021-10-12
Payer: COMMERCIAL

## 2021-10-12 DIAGNOSIS — M75.41 SUBACROMIAL IMPINGEMENT, RIGHT: Primary | ICD-10-CM

## 2021-10-12 PROCEDURE — 97140 MANUAL THERAPY 1/> REGIONS: CPT | Performed by: PHYSICAL THERAPIST

## 2021-10-12 PROCEDURE — 97112 NEUROMUSCULAR REEDUCATION: CPT | Performed by: PHYSICAL THERAPIST

## 2021-10-18 ENCOUNTER — OFFICE VISIT (OUTPATIENT)
Dept: PHYSICAL THERAPY | Facility: REHABILITATION | Age: 21
End: 2021-10-18
Payer: COMMERCIAL

## 2021-10-18 DIAGNOSIS — M75.41 SUBACROMIAL IMPINGEMENT, RIGHT: Primary | ICD-10-CM

## 2021-10-18 PROCEDURE — 97112 NEUROMUSCULAR REEDUCATION: CPT | Performed by: PHYSICAL THERAPIST

## 2021-10-18 PROCEDURE — 97140 MANUAL THERAPY 1/> REGIONS: CPT | Performed by: PHYSICAL THERAPIST

## 2021-10-20 ENCOUNTER — TELEMEDICINE (OUTPATIENT)
Dept: FAMILY MEDICINE CLINIC | Facility: CLINIC | Age: 21
End: 2021-10-20
Payer: COMMERCIAL

## 2021-10-20 VITALS — HEIGHT: 77 IN | BODY MASS INDEX: 24.68 KG/M2 | TEMPERATURE: 100.1 F | WEIGHT: 209 LBS

## 2021-10-20 DIAGNOSIS — J02.9 PHARYNGITIS, UNSPECIFIED ETIOLOGY: ICD-10-CM

## 2021-10-20 DIAGNOSIS — B34.9 VIRAL INFECTION, UNSPECIFIED: Primary | ICD-10-CM

## 2021-10-20 PROCEDURE — 1036F TOBACCO NON-USER: CPT | Performed by: NURSE PRACTITIONER

## 2021-10-20 PROCEDURE — U0005 INFEC AGEN DETEC AMPLI PROBE: HCPCS | Performed by: NURSE PRACTITIONER

## 2021-10-20 PROCEDURE — 99213 OFFICE O/P EST LOW 20 MIN: CPT | Performed by: NURSE PRACTITIONER

## 2021-10-20 PROCEDURE — 3008F BODY MASS INDEX DOCD: CPT | Performed by: NURSE PRACTITIONER

## 2021-10-20 PROCEDURE — U0003 INFECTIOUS AGENT DETECTION BY NUCLEIC ACID (DNA OR RNA); SEVERE ACUTE RESPIRATORY SYNDROME CORONAVIRUS 2 (SARS-COV-2) (CORONAVIRUS DISEASE [COVID-19]), AMPLIFIED PROBE TECHNIQUE, MAKING USE OF HIGH THROUGHPUT TECHNOLOGIES AS DESCRIBED BY CMS-2020-01-R: HCPCS | Performed by: NURSE PRACTITIONER

## 2021-10-21 ENCOUNTER — TELEPHONE (OUTPATIENT)
Dept: FAMILY MEDICINE CLINIC | Facility: CLINIC | Age: 21
End: 2021-10-21

## 2021-10-21 DIAGNOSIS — J02.9 PHARYNGITIS, UNSPECIFIED ETIOLOGY: Primary | ICD-10-CM

## 2021-10-21 RX ORDER — AMOXICILLIN 875 MG/1
875 TABLET, COATED ORAL 2 TIMES DAILY
Qty: 20 TABLET | Refills: 0 | Status: SHIPPED | OUTPATIENT
Start: 2021-10-21 | End: 2021-10-31

## 2021-10-27 ENCOUNTER — OFFICE VISIT (OUTPATIENT)
Dept: PHYSICAL THERAPY | Facility: REHABILITATION | Age: 21
End: 2021-10-27
Payer: COMMERCIAL

## 2021-10-27 DIAGNOSIS — M75.41 SUBACROMIAL IMPINGEMENT, RIGHT: Primary | ICD-10-CM

## 2021-10-27 PROCEDURE — 97140 MANUAL THERAPY 1/> REGIONS: CPT | Performed by: PHYSICAL THERAPIST

## 2021-10-27 PROCEDURE — 97112 NEUROMUSCULAR REEDUCATION: CPT | Performed by: PHYSICAL THERAPIST

## 2021-11-01 ENCOUNTER — APPOINTMENT (OUTPATIENT)
Dept: PHYSICAL THERAPY | Facility: REHABILITATION | Age: 21
End: 2021-11-01
Payer: COMMERCIAL

## 2021-11-04 ENCOUNTER — OFFICE VISIT (OUTPATIENT)
Dept: PHYSICAL THERAPY | Facility: REHABILITATION | Age: 21
End: 2021-11-04
Payer: COMMERCIAL

## 2021-11-04 DIAGNOSIS — M75.41 SUBACROMIAL IMPINGEMENT, RIGHT: Primary | ICD-10-CM

## 2021-11-04 PROCEDURE — 97112 NEUROMUSCULAR REEDUCATION: CPT | Performed by: PHYSICAL THERAPIST

## 2021-11-04 PROCEDURE — 97140 MANUAL THERAPY 1/> REGIONS: CPT | Performed by: PHYSICAL THERAPIST

## 2021-11-10 ENCOUNTER — OFFICE VISIT (OUTPATIENT)
Dept: FAMILY MEDICINE CLINIC | Facility: CLINIC | Age: 21
End: 2021-11-10
Payer: COMMERCIAL

## 2021-11-10 VITALS
OXYGEN SATURATION: 97 % | DIASTOLIC BLOOD PRESSURE: 80 MMHG | HEART RATE: 90 BPM | HEIGHT: 77 IN | TEMPERATURE: 98.4 F | SYSTOLIC BLOOD PRESSURE: 120 MMHG | BODY MASS INDEX: 23.85 KG/M2 | WEIGHT: 202 LBS | RESPIRATION RATE: 16 BRPM

## 2021-11-10 DIAGNOSIS — R09.82 POST-NASAL DRIP: ICD-10-CM

## 2021-11-10 DIAGNOSIS — Z23 INFLUENZA VACCINE NEEDED: ICD-10-CM

## 2021-11-10 DIAGNOSIS — J30.9 ALLERGIC RHINITIS, UNSPECIFIED SEASONALITY, UNSPECIFIED TRIGGER: Primary | ICD-10-CM

## 2021-11-10 PROCEDURE — 3008F BODY MASS INDEX DOCD: CPT | Performed by: NURSE PRACTITIONER

## 2021-11-10 PROCEDURE — 99213 OFFICE O/P EST LOW 20 MIN: CPT | Performed by: NURSE PRACTITIONER

## 2021-11-10 PROCEDURE — 90686 IIV4 VACC NO PRSV 0.5 ML IM: CPT | Performed by: NURSE PRACTITIONER

## 2021-11-10 PROCEDURE — 90471 IMMUNIZATION ADMIN: CPT | Performed by: NURSE PRACTITIONER

## 2021-11-10 PROCEDURE — 1036F TOBACCO NON-USER: CPT | Performed by: NURSE PRACTITIONER

## 2021-11-10 RX ORDER — FEXOFENADINE HYDROCHLORIDE 60 MG/1
60 TABLET, FILM COATED ORAL DAILY
COMMUNITY

## 2022-09-29 ENCOUNTER — OFFICE VISIT (OUTPATIENT)
Dept: URGENT CARE | Facility: CLINIC | Age: 22
End: 2022-09-29
Payer: COMMERCIAL

## 2022-09-29 VITALS
OXYGEN SATURATION: 97 % | HEART RATE: 71 BPM | DIASTOLIC BLOOD PRESSURE: 71 MMHG | RESPIRATION RATE: 16 BRPM | SYSTOLIC BLOOD PRESSURE: 131 MMHG | TEMPERATURE: 98.1 F

## 2022-09-29 DIAGNOSIS — M77.8 SHOULDER TENDONITIS, RIGHT: Primary | ICD-10-CM

## 2022-09-29 PROCEDURE — 99213 OFFICE O/P EST LOW 20 MIN: CPT | Performed by: PHYSICIAN ASSISTANT

## 2022-09-29 RX ORDER — PREDNISONE 10 MG/1
TABLET ORAL
Qty: 30 TABLET | Refills: 0 | Status: SHIPPED | OUTPATIENT
Start: 2022-09-29

## 2022-09-29 NOTE — PROGRESS NOTES
[facility]        NAME: Darnell Lucas is a 25 y o  male  : 2000    MRN: [id number]  DATE: 2022  TIME: 7:24 PM    /71   Pulse 71   Temp 98 1 °F (36 7 °C)   Resp 16   SpO2 97%     Assessment and Plan   Shoulder tendonitis, right [M77 8]  1  Shoulder tendonitis, right  predniSONE 10 mg tablet         Patient Instructions       Follow up with PCP in 3-5 days  Proceed to  ER if symptoms worsen  Chief Complaint     Chief Complaint   Patient presents with    Shoulder Pain     Pt reports chronic shoulder right pain, usually around this time of the year when baseball practice starts  History of Present Illness       Pt with right shoulder pain ,  Same as last year, when pitching and pt gets tendonitis,  No known injury      Review of Systems   Review of Systems   Constitutional: Negative  HENT: Negative  Eyes: Negative  Respiratory: Negative  Cardiovascular: Negative  Gastrointestinal: Negative  Endocrine: Negative  Genitourinary: Negative  Musculoskeletal: Negative  Skin: Negative  Allergic/Immunologic: Negative  Neurological: Negative  Hematological: Negative  Psychiatric/Behavioral: Negative  All other systems reviewed and are negative          Current Medications       Current Outpatient Medications:     predniSONE 10 mg tablet, 5 tabs po qd x 2 days then 4 tabs po qd x 2 days then 3 tabs po qd x 2 days then 2 tabs po qd x 2 days then 1 tab po qd x 2 days, Disp: 30 tablet, Rfl: 0    fexofenadine (ALLEGRA) 60 MG tablet, Take 60 mg by mouth daily prn, Disp: , Rfl:     Current Allergies     Allergies as of 2022    (No Known Allergies)            The following portions of the patient's history were reviewed and updated as appropriate: allergies, current medications, past family history, past medical history, past social history, past surgical history and problem list      Past Medical History:   Diagnosis Date    Anxiety     Fractures     stress fx of Right foot and toe fx  : Pinkie finger right FX       Past Surgical History:   Procedure Laterality Date    CYST REMOVAL      upper Gum     FL INJECTION RIGHT ELBOW (ARTHROGRAM)  12/16/2019    FL INJECTION RIGHT SHOULDER (ARTHROGRAM)  9/24/2021    FL SHLDR ARTHROSCOP,SURG,CAPSULORRHAPHY Left 9/12/2018    Procedure: SHOULDER ARTHROSCOPY WITH LABRAL POSTERIOR AND ANTERIOR REPAIR;  Surgeon: Isaias Ambrocio MD;  Location: [facility];  Service: Orthopedics       Family History   Problem Relation Age of Onset    Hypertension Mother     Heart attack Maternal Grandfather          Medications have been verified  Objective   /71   Pulse 71   Temp 98 1 °F (36 7 °C)   Resp 16   SpO2 97%        Physical Exam     Physical Exam  Vitals and nursing note reviewed  Constitutional:       Appearance: Normal appearance  He is normal weight  Comments: Pt declines xrays right shoulder    HENT:      Head: Normocephalic and atraumatic  Right Ear: Tympanic membrane, ear canal and external ear normal       Left Ear: Tympanic membrane, ear canal and external ear normal       Nose: Nose normal       Mouth/Throat:      Mouth: Mucous membranes are moist       Pharynx: Oropharynx is clear  Eyes:      Extraocular Movements: Extraocular movements intact  Conjunctiva/sclera: Conjunctivae normal       Pupils: Pupils are equal, round, and reactive to light  Cardiovascular:      Rate and Rhythm: Normal rate and regular rhythm  Pulses: Normal pulses  Heart sounds: Normal heart sounds  Pulmonary:      Effort: Pulmonary effort is normal       Breath sounds: Normal breath sounds  Abdominal:      General: Abdomen is flat  Bowel sounds are normal       Palpations: Abdomen is soft  Musculoskeletal:         General: Normal range of motion  Cervical back: Normal range of motion and neck supple        Comments: Right shoulder from  No clavicle pain no ac joint pain  No trapezius tenderness, humeral head lateral tenderness   Distal neuro and vascular wnl    Skin:     General: Skin is warm  Capillary Refill: Capillary refill takes less than 2 seconds  Neurological:      General: No focal deficit present  Mental Status: He is alert and oriented to person, place, and time     Psychiatric:         Mood and Affect: Mood normal          Behavior: Behavior normal

## 2022-11-15 ENCOUNTER — TELEPHONE (OUTPATIENT)
Dept: FAMILY MEDICINE CLINIC | Facility: CLINIC | Age: 22
End: 2022-11-15

## 2022-11-15 NOTE — TELEPHONE ENCOUNTER
Yes, he may schedule flu vaccine and Tdap with nurse visit  Only other immunization he may want to consider is for Gardasil (human Papilloma Virus)  If he is interested in Gardasil it is a 3 shot series  Now, 2 mos, 6 mos

## 2022-11-15 NOTE — TELEPHONE ENCOUNTER
He can schedule Gardisil with nurse visit  As long as it does not interfere with his work/school schedule for 1st shot, then 2 mos, then 6 mos

## 2022-11-15 NOTE — TELEPHONE ENCOUNTER
Mom called regarding scheduling a tetanus shot for Anyi Leiva as well as a flu shot  She said she had dropped off immunization records for Anyi Leiva that were to be reviewed prior to scheduling tetanus shot and just wanted to follow up on that  Please advise

## 2022-11-18 ENCOUNTER — CLINICAL SUPPORT (OUTPATIENT)
Dept: FAMILY MEDICINE CLINIC | Facility: CLINIC | Age: 22
End: 2022-11-18

## 2022-11-18 DIAGNOSIS — Z23 NEED FOR INFLUENZA VACCINATION: Primary | ICD-10-CM

## 2022-11-18 DIAGNOSIS — Z23 NEED FOR DIPHTHERIA-TETANUS-PERTUSSIS (TDAP) VACCINE: ICD-10-CM

## 2023-03-01 ENCOUNTER — OFFICE VISIT (OUTPATIENT)
Dept: PHYSICAL THERAPY | Facility: REHABILITATION | Age: 23
End: 2023-03-01

## 2023-03-01 DIAGNOSIS — M25.511 ACUTE PAIN OF RIGHT SHOULDER: Primary | ICD-10-CM

## 2023-03-01 NOTE — PROGRESS NOTES
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   Upon evaluation, Paty Shukla demonstrates painful and limited AROM into flexion as well as ER with weak and painful resisted ER  Notable TTP and tone changes to R teres major   The listed impairments and functional limitation are effecting Paty Shukla ability to function at prior level  They can continue to benefit from physical therapy services at this times in order to address the above discussed impairments and functional limitation in order to allow for a return to premorbid status with baseball  Post session patient with pain free AROM and PROM, shoulder MMT improve 1/2 grade with reports of minimal pain    HEP: Self STM teres mj, OH stretch for flexion and ER, light RC ER at side and 90/90      Impairments: abnormal muscle firing, abnormal muscle tone, abnormal or restricted ROM, abnormal movement, activity intolerance, impaired physical strength and pain with function  Understanding of Dx/Px/POC: good   Prognosis: good    Plan  Patient would benefit from: skilled PT  Planned modality interventions: thermotherapy: hydrocollator packs  Planned therapy interventions: joint mobilization, manual therapy, ADL training, balance, balance/weight bearing training, neuromuscular re-education, home exercise program, therapeutic exercise, therapeutic activities, strengthening, patient education, functional ROM exercises and gait training  Frequency: 2x week  Duration in weeks: 12  Treatment plan discussed with: patient        Subjective Evaluation    History of Present Illness  Mechanism of injury: Reji Velázquez is a 25 y o  male presenting to physical therapy on 23 with referral from [facility] for right shoulder pain that began about 2 weeks ago  States that he symptoms began when warming up throwing a football  He reports a sharp pain at the top of his shoulder region and is felt most when increasing velocity on pitches as well as throwing for longer distance  When throwing pain is felt at release  Short throws do not seem to bother him  Pain location is mostly lateral and anterior shoulder and will at times feel pain into his UT region  He reports that when symptoms first started he felt some ulnar side ache into his fingers but that has since resolved  No prior history of shoulder surgery but did have a radial nerve entrapment last year which resolved with PT  Pain  Current pain ratin  At worst pain ratin  Location: see above      Diagnostic Tests  No diagnostic tests performed  Patient Goals  Patient goals for therapy: decreased pain and return to sport/leisure activities  Patient goal: pain free throwing     Short Term Goals:   1  Patient will be Independent with hep  2  Patient will improve pain with activity by 50%  3  Patient will have pain free shoulder AROM  4  Patient will have 5/5 shoulder ER      Long Term Goals:   1  Patient will improve FOTO to greater then goal  2  Patient will improve pain with activity to 1/10 or less  3  Patient will continue with HEP independence to allow for decreased future reoccurrence of pain and loss in function  4  Patient will throw pain free        Objective     Posture: depressed scapula b/l           Scapular position:  Depressed b/l    Palpation: TTP teres major on R with + tone increase vs L           MMT         AROM          PROM    Shoulder       L       R        L           R      L     R   Flex  ERP   Abd           IR  80 45   ER       100 100 ERP            Rhomboid         Mid Trap  4       Low Trap  3+       Serratus         Infraspnatus 5 4 P       Teres Minor 5 3+ P       Sub Scap             Rotator Cuff Testing:  ER Lag: negative   Drop Arm: negative   Arc Sign: negative         Labral Testing:  NT    Load and Shift: increased anterior laxity      ACJ Testing:  Mobility: normal     Segmental mobility: GHJ: hypermobile               Precautions: standard, history of L labral repair      Manuals                                                                 Neuro Re-Ed                                                                                                        Ther Ex                                                                                                                     Ther Activity                                       Gait Training                                       Modalities

## 2023-03-10 ENCOUNTER — OFFICE VISIT (OUTPATIENT)
Dept: PHYSICAL THERAPY | Facility: REHABILITATION | Age: 23
End: 2023-03-10

## 2023-03-10 DIAGNOSIS — M25.511 ACUTE PAIN OF RIGHT SHOULDER: Primary | ICD-10-CM

## 2023-03-10 NOTE — PROGRESS NOTES
Daily Note     Today's date: 3/10/2023  Patient name: Jostin Jett  : 2000  MRN: [id number]  Referring provider: Altaf Cobian PT  Dx:   Encounter Diagnosis     ICD-10-CM    1  Acute pain of right shoulder  M25 511           Start Time: 0700  Stop Time: 0740  Total time in clinic (min): 40 minutes    Subjective: Patient reports that he has been having less pain throwing, loosens up quicker  Overall pain much less since 2 weeks ago  Objective: See treatment diary below  ERP flexion with OP - improved post RSF  Improved teres major tone vs 1 week ago with less TTP    Assessment: Tolerated treatment well  Patient with improvements as noted above  Good response to added repeated shoulder flexion in standing  No HEP updates as patient is porgressing well  Will follow up in 10 days  Plan: Continue per plan of care        Precautions: standard, history of L labral repair      Manuals 3/10            assessment 5'            STM pec minor, sadaf lind 15'                                      Neuro Re-Ed             ER XS 3#  10x @ side, 10x @ 90/90  Ys 10x            Repeated shoulder ext at table 2x10                                                                             Ther Ex                                                                                                                     Ther Activity                                       Gait Training                                       Modalities

## 2023-03-20 ENCOUNTER — OFFICE VISIT (OUTPATIENT)
Dept: PHYSICAL THERAPY | Facility: REHABILITATION | Age: 23
End: 2023-03-20

## 2023-03-20 DIAGNOSIS — M25.511 ACUTE PAIN OF RIGHT SHOULDER: Primary | ICD-10-CM

## 2023-03-20 NOTE — PROGRESS NOTES
Daily Note     Today's date: 3/20/2023  Patient name: Arianna Johansen  : 2000  MRN: [id number]  Referring provider: Bijal Yao PT  Dx:   Encounter Diagnosis     ICD-10-CM    1  Acute pain of right shoulder  M25 511           Start Time: 830  Stop Time: 910  Total time in clinic (min): 40 minutes    Subjective: Patient reports doing well, states that he threw in a game over the weekend and felt good both warming up as well as when in the game  States that he will get some soreness at times when warming up but that goes away  Objective: See treatment diary below  Improved tone without min/no TTP R sadaf lind  Pain free shoulder AROM and PROM, only mild ERP ER that improves with GHJ posterior glide    Assessment: Tolerated treatment well  Will hold PT at this time over the next week  Will keep case open in the event he does need manual work  If I do not hear from him in 2 weeks will DC PT  HEP reviewed with self mobility, stretching and RC/scap re-ed  Plan: Continue per plan of care        Precautions: standard, history of L labral repair      Manuals 3/10 3/20           assessment 5' 5'           STM pec minor, teres mj 15' 25'                                     Neuro Re-Ed             ER XS 3#  10x @ side, 10x @ 90/90  Ys 10x XS 3#  10x @ side, 10x @ 90/90, Y 10x           Repeated shoulder ext at table 2x10            HEP education  5'                                                               Ther Ex                                                                                                                     Ther Activity                                       Gait Training                                       Modalities

## 2023-06-08 ENCOUNTER — APPOINTMENT (OUTPATIENT)
Dept: RADIOLOGY | Facility: OTHER | Age: 23
End: 2023-06-08
Payer: COMMERCIAL

## 2023-06-08 ENCOUNTER — OFFICE VISIT (OUTPATIENT)
Dept: OBGYN CLINIC | Facility: OTHER | Age: 23
End: 2023-06-08
Payer: COMMERCIAL

## 2023-06-08 VITALS
WEIGHT: 211 LBS | HEART RATE: 65 BPM | BODY MASS INDEX: 24.91 KG/M2 | HEIGHT: 77 IN | SYSTOLIC BLOOD PRESSURE: 142 MMHG | DIASTOLIC BLOOD PRESSURE: 86 MMHG

## 2023-06-08 DIAGNOSIS — M25.511 ACUTE PAIN OF RIGHT SHOULDER: ICD-10-CM

## 2023-06-08 DIAGNOSIS — M24.811 INTERNAL DERANGEMENT OF RIGHT SHOULDER: ICD-10-CM

## 2023-06-08 DIAGNOSIS — M25.611 GLENOHUMERAL INTERNAL ROTATION DEFICIT OF RIGHT SHOULDER: Primary | ICD-10-CM

## 2023-06-08 PROCEDURE — 73030 X-RAY EXAM OF SHOULDER: CPT

## 2023-06-08 PROCEDURE — 99204 OFFICE O/P NEW MOD 45 MIN: CPT | Performed by: ORTHOPAEDIC SURGERY

## 2023-06-08 NOTE — PROGRESS NOTES
Assessment  Diagnoses and all orders for this visit:    Glenohumeral internal rotation deficit of right shoulder    Internal derangement of right shoulder        Discussion and Plan:    Patient has an examination consistent with GIRD and worrisome for labral pathology, given their lack of improvement with physical therapy and home exercise program he is indicated at this time for an MRI scan to evaluate for surgical pathology  We will review the results of the study when it has been obtained and discuss further treatment options  Subjective:   Patient ID: Eve Childers is a 25 y o  male      HPI  The patient presents with a chief complaint of right shoulder pain  The pain began 2 year(s) ago and is associated with an acute injury  Patient is a   He states his symptoms worsened in January when they started training  The patient describes the pain as aching, dull and sharp in intensity,  intermittent in timing, and localizes the pain to the  right deltoid  The pain is worse with raising arm over head and throwing, reaching and relieved by rest   The pain is not associated with numbness and tingling  The pain is not associated with constitutional symptoms  The patient is not awoken at night by the pain however had pain when lying on the shoulder  The patient went to physical therapy and has continued her physical therapy directed home exercise program      Left shoulder posterior labral repair 9/12/18      The following portions of the patient's history were reviewed and updated as appropriate: allergies, current medications, past family history, past medical history, past social history, past surgical history and problem list     Review of Systems   Constitutional: Negative for chills and fever  HENT: Negative for drooling and hearing loss  Eyes: Negative for visual disturbance  Respiratory: Negative for cough and shortness of breath  Cardiovascular: Negative for chest pain  "  Gastrointestinal: Negative for abdominal pain  Skin: Negative for rash  Psychiatric/Behavioral: Negative for agitation  Objective:  /86 (BP Location: Left arm, Patient Position: Sitting, Cuff Size: Adult)   Pulse 65   Ht 6' 5\" (1 956 m) Comment: verbal  Wt 95 7 kg (211 lb)   BMI 25 02 kg/m²       Right Shoulder Exam     Range of Motion   External rotation: 80   Forward flexion: 170   Internal rotation 0 degrees: Lumbar   Internal rotation 90 degrees: 60     Muscle Strength   Abduction: 5/5   Internal rotation: 5/5   External rotation: 5/5     Tests   Gutierres test: positive  Cross arm: positive  Impingement: negative    Other   Erythema: absent  Sensation: normal  Pulse: present    Comments:    Positive Zhou's   Negative speeds             Physical Exam  Vitals reviewed  Constitutional:       Appearance: He is well-developed  HENT:      Head: Normocephalic  Eyes:      Pupils: Pupils are equal, round, and reactive to light  Pulmonary:      Effort: Pulmonary effort is normal    Abdominal:      General: Abdomen is flat  There is no distension  Skin:     General: Skin is warm and dry  I have personally reviewed pertinent films in PACS and my interpretation is as follows  Right shoulder x-rays demonstrates no fracture or dislocation  Scribe Attestation    I,:  Brandi Chelly am acting as a scribe while in the presence of the attending physician :       I,:  Liberty Russ MD personally performed the services described in this documentation    as scribed in my presence  :         "

## 2023-07-19 ENCOUNTER — TELEPHONE (OUTPATIENT)
Dept: RADIOLOGY | Facility: HOSPITAL | Age: 23
End: 2023-07-19

## 2023-07-19 NOTE — NURSING NOTE
Call placed to pt to discuss upcoming appointment at [facility] and consultation completed. Pt is having a R Shoulder Arthrogram completed on 7/24/2023. Allergies reviewed and verified pt does not currently take any anticoagulant medications. Pre procedure instructions including diet and taking own medications discussed with pt. Pt instructed that he may eat normally and take medications as usual before the procedure. Pt verbalized understanding of instructions given. Reminded pt of the location, date and time of procedure. My number was given to call if any questions or concerns arise pre or post procedure.

## 2023-07-24 ENCOUNTER — HOSPITAL ENCOUNTER (OUTPATIENT)
Dept: RADIOLOGY | Facility: HOSPITAL | Age: 23
Discharge: HOME | End: 2023-07-24
Attending: ORTHOPAEDIC SURGERY
Payer: COMMERCIAL

## 2023-07-24 ENCOUNTER — HOSPITAL ENCOUNTER (OUTPATIENT)
Dept: RADIOLOGY | Facility: HOSPITAL | Age: 23
Discharge: HOME | End: 2023-07-24
Attending: ORTHOPAEDIC SURGERY | Admitting: RADIOLOGY
Payer: COMMERCIAL

## 2023-07-24 DIAGNOSIS — M24.811 INTERNAL DERANGEMENT OF RIGHT SHOULDER: ICD-10-CM

## 2023-07-24 DIAGNOSIS — M25.611 GLENOHUMERAL INTERNAL ROTATION DEFICIT OF RIGHT SHOULDER: ICD-10-CM

## 2023-07-24 PROCEDURE — 77002 NEEDLE LOCALIZATION BY XRAY: CPT

## 2023-07-24 PROCEDURE — 73222 MRI JOINT UPR EXTREM W/DYE: CPT

## 2023-07-24 PROCEDURE — G1004 CDSM NDSC: HCPCS

## 2023-07-24 PROCEDURE — 23350 INJECTION FOR SHOULDER X-RAY: CPT

## 2023-07-24 PROCEDURE — A9585 GADOBUTROL INJECTION: HCPCS | Performed by: ORTHOPAEDIC SURGERY

## 2023-07-24 RX ADMIN — IOHEXOL 5 ML: 300 INJECTION, SOLUTION INTRAVENOUS at 14:15

## 2023-07-24 RX ADMIN — GADOBUTROL 2 ML: 604.72 INJECTION INTRAVENOUS at 14:30

## 2023-07-27 ENCOUNTER — OFFICE VISIT (OUTPATIENT)
Dept: OBGYN CLINIC | Facility: OTHER | Age: 23
End: 2023-07-27
Payer: COMMERCIAL

## 2023-07-27 ENCOUNTER — TELEPHONE (OUTPATIENT)
Dept: OBGYN CLINIC | Facility: HOSPITAL | Age: 23
End: 2023-07-27

## 2023-07-27 VITALS
BODY MASS INDEX: 24.56 KG/M2 | DIASTOLIC BLOOD PRESSURE: 81 MMHG | WEIGHT: 208 LBS | HEART RATE: 54 BPM | SYSTOLIC BLOOD PRESSURE: 133 MMHG | HEIGHT: 77 IN

## 2023-07-27 DIAGNOSIS — S43.431D SUPERIOR GLENOID LABRUM LESION OF RIGHT SHOULDER, SUBSEQUENT ENCOUNTER: Primary | ICD-10-CM

## 2023-07-27 PROBLEM — S43.431A SUPERIOR GLENOID LABRUM LESION OF RIGHT SHOULDER: Status: ACTIVE | Noted: 2023-07-27

## 2023-07-27 PROCEDURE — 99214 OFFICE O/P EST MOD 30 MIN: CPT | Performed by: ORTHOPAEDIC SURGERY

## 2023-07-27 RX ORDER — CHLORHEXIDINE GLUCONATE 0.12 MG/ML
15 RINSE ORAL ONCE
OUTPATIENT
Start: 2023-07-27 | End: 2023-07-27

## 2023-07-27 NOTE — LETTER
July 27, 2023     Cole Roman, PEREZ    Patient: Camron Arroyo   YOB: 2000   Date of Visit: 7/27/2023       Dear Dr. Radha Franco: Thank you for referring Camron Arroyo to me for evaluation. Below are my notes for this consultation. If you have questions, please do not hesitate to call me. I look forward to following your patient along with you. 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 We discussed treatment options including continuing physical therapy and activity modification versus undergoing arthroscopic repair of a SLAP lesion. 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 He is going to consider all this, discussed with his family and his physical therapist Nima Cesar and will let us know if he wishes to proceed forward with repair or if he wants to continue with physical therapy and try other interventions possibly a corticosteroid injection which I am not keen to provide to young patients but I am happy to discuss this in the future if he wishes to avoid surgical treatment for his right SLAP lesion. Subjective:   Patient ID: Camron Arroyo is a 25 y.o. male      HPI    Patient returns for follow-up of his MRI arthrogram of the right shoulder.   Symptoms have not changed, although he does state he starting to have some instability of his left shoulder which underwent posterior labral repair in the past and has been fine until he started hitting again and now having some episodes of instability. The following portions of the patient's history were reviewed and updated as appropriate: allergies, current medications, past family history, past medical history, past social history, past surgical history and problem list.      Objective:  /81 (BP Location: Left arm, Patient Position: Sitting, Cuff Size: Adult)   Pulse (!) 54   Ht 6' 5" (1.956 m) Comment: verbal  Wt 94.3 kg (208 lb)   BMI 24.67 kg/m²       Right Shoulder Exam     Tenderness   The patient is experiencing no tenderness. Range of Motion   Active abduction:  100 normal   Forward flexion:  180 normal     Muscle Strength   Abduction: 5/5   Internal rotation: 5/5   External rotation: 5/5     Tests   Gutierres test: negative  Impingement: negative  Drop arm: negative    Other   Erythema: absent  Scars: absent  Sensation: normal  Pulse: present    Comments:  + Obriens Sign              I have personally reviewed pertinent films in PACS and my interpretation is as follows.     MRI arthrogram right shoulder shows a SLAP lesion with extension to the superior aspect of the posterior labrum

## 2023-07-27 NOTE — TELEPHONE ENCOUNTER
Caller: patient    Doctor: Constantin Pond     Reason for call: patient would like to opt for surgery instead of PT    Call back#: [phone]

## 2023-07-27 NOTE — PROGRESS NOTES
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 We discussed treatment options including continuing physical therapy and activity modification versus undergoing arthroscopic repair of a SLAP lesion. 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 He is going to consider all this, discussed with his family and his physical therapist Natalia Waters and will let us know if he wishes to proceed forward with repair or if he wants to continue with physical therapy and try other interventions possibly a corticosteroid injection which I am not keen to provide to young patients but I am happy to discuss this in the future if he wishes to avoid surgical treatment for his right SLAP lesion. ADDENDUM  The patient has contact the office and does wish to proceed forward with scheduling for arthroscopic SLAP repair of his right shoulder as we discussed. We did discuss the risks and benefits as well as alternatives to surgical treatment and given his desire to proceed forward with surgical intervention we will begin the scheduling process. A thorough discussion was had regarding the risks and benefits of the procedure (arthroscopic SLAP repair). Risks discussed include but are not limited to infection, neurovascular injury, risk of anesthesia, recurrent tear of the labrum, need for further surgery, need for long head biceps tenodesis, stiffness requiring prolonged rehabilitation or return to the operating room for manipulation.   After this discussion, all questions were answered and informed consent was obtained in the office for arthroscopic SLAP repair of the right shoulder. Subjective:   Patient ID: Joann Dhillon is a 25 y.o. male      HPI    Patient returns for follow-up of his MRI arthrogram of the right shoulder. Symptoms have not changed, although he does state he starting to have some instability of his left shoulder which underwent posterior labral repair in the past and has been fine until he started hitting again and now having some episodes of instability. The following portions of the patient's history were reviewed and updated as appropriate: allergies, current medications, past family history, past medical history, past social history, past surgical history and problem list.      Objective:  /81 (BP Location: Left arm, Patient Position: Sitting, Cuff Size: Adult)   Pulse (!) 54   Ht 6' 5" (1.956 m) Comment: verbal  Wt 94.3 kg (208 lb)   BMI 24.67 kg/m²       Right Shoulder Exam     Tenderness   The patient is experiencing no tenderness. Range of Motion   Active abduction:  100 normal   Forward flexion:  180 normal     Muscle Strength   Abduction: 5/5   Internal rotation: 5/5   External rotation: 5/5     Tests   Gutierres test: negative  Impingement: negative  Drop arm: negative    Other   Erythema: absent  Scars: absent  Sensation: normal  Pulse: present    Comments:  + Obriens Sign        Physical Exam  Vitals and nursing note reviewed. Constitutional:       General: He is not in acute distress. Appearance: He is well-developed. HENT:      Head: Normocephalic and atraumatic. Eyes:      Conjunctiva/sclera: Conjunctivae normal.   Cardiovascular:      Rate and Rhythm: Normal rate and regular rhythm. Pulses: Normal pulses. Heart sounds: Normal heart sounds. No murmur heard. Pulmonary:      Effort: Pulmonary effort is normal. No respiratory distress. Breath sounds: Normal breath sounds.    Abdominal: General: Abdomen is flat. Palpations: Abdomen is soft. Tenderness: There is no abdominal tenderness. Musculoskeletal:      Cervical back: Neck supple. Skin:     General: Skin is warm and dry. Capillary Refill: Capillary refill takes less than 2 seconds. Neurological:      Mental Status: He is alert. Psychiatric:         Mood and Affect: Mood normal.             I have personally reviewed pertinent films in PACS and my interpretation is as follows.     MRI arthrogram right shoulder shows a SLAP lesion with extension to the superior aspect of the posterior labrum

## 2023-07-27 NOTE — PATIENT INSTRUCTIONS
You are being scheduled for a shoulder arthroscopy to treat your symptoms. Below are some instructions and information on what to expect before and after your surgery. Pre-Surgical Preparation for Arthroscopic Shoulder Surgery: You will be contacted the evening prior to your surgery to confirm the scheduled time of the procedure and when to arrive at the hospital.   Do not eat or drink anything after midnight the night before your surgery. Since you are having out-patient surgery, make sure that you have someone who can drive you home later in the day. Also, prepare that person for a long day, as the process of safely preparing for and recovering from the procedure is more time consuming than the actual procedure! As you will be in a sling after surgery, please wear or bring a loose fitting button-down shirt so that you can easily place this over the sling when you leave the surgical suite. This avoids having to place the operative arm in a sleeve. Most patients find that this is the easiest outfit to wear for the first week or so after surgery so you may want to plan accordingly. Most patients find that lying down in bed after shoulder surgery accentuates their discomfort. This is likely related to the effect of gravity on the swelling in the shoulder. As a result, most patients sleep better in a recliner or in bed with pillows propped up behind their back for the first few days or weeks after surgery. It is a good idea to plan for this ahead of time so there will be less hassle getting things set up the night after surgery. What to Expect After Arthroscopic Shoulder Surgery: It is normal to have swelling and discomfort in the shoulder for several days or a week after surgery. It is also normal to have a small amount of drainage from the surgical wounds (especially the first few days after surgery), as we put fluid into the shoulder to visualize the structures during surgery. It is NOT normal to have foul smelling, purulent drainage and if this is noted, please contact the office immediately or proceed to the emergency room for evaluation as this may indicate an infection. Applying ice bags to the shoulder may help with pain that is not controlled by the regional block. Ice should be applied 20-30 minutes at a time, every hour or two. Make sure to put a thin towel or T-shirt next to your skin to avoid direct contact of the ice with the skin. Icing is most helpful in the first 48 hours, although many people find that continuing past this time frame lessens their postoperative pain. Please note that your post-operative dressing is not conductive to ice, so if you need to, it is okay to remove that dressing even the night after surgery and place band-aids over the wounds in order for the ice to take effect. Pain Control    Most patients will receive a nerve block, the local anesthetic may keep your whole arm numb for up to 4 days. You will be given a prescription for narcotic pain medication when you are discharged from the hospital.  With the newer nerve block that is being utilized, patients are rarely requiring the use of this narcotic pain medication. If you find you do not tolerate that type of pain medicine well, call our office and we will try another one. In addition to the narcotic pain medication, it is safe to use an anti-inflammatory (unless the patient has a medical condition that would not allow safe use of this mediation). This includes the Advil, Motrin, Ibuprofen and Alleve category of medications. Simply follow the over the counter dosing on the package and use as indicated as another adjunct. Importantly since these medications are all very similar, use only one of them. Tylenol is a separate medication that can be utilized as well and can be taken at the same time as the other medication or given in a "staggered" manner.   Just make sure that you follow the dosing on the over the counter bottle instructions. Also make sure that the pain medication prescribed by Dr Tye Rodríguez team does not contain acetaminophen (this is found in Percocet and Vicodin). Typically we do not prescribe those types of pain medications but if for some reason that has been prescribed DO NOT add more Tylenol (acetaminophen) as you could end up taking too much of that medication. As mentioned above, most patients find that lying down accentuates their discomfort. You might sleep better in a recliner, or propped up in bed. Dr. Juan Carlos Mathur encourages patients to safely ambulate around the house as much as possible in the first few days after the procedure as this can help with blood circulation in the legs. While the incidence of blood clots is very rare following shoulder surgery, early ambulation is a great way to help decrease the already low rate. 24 hours after the surgery you may remove the bandage and cover incisions with Band-Aids if needed. At that time you may shower, the wounds will have a surgical glue that will protect them from shower water but do not submerge your incisions directly (bathing or swimming) until at least 2 weeks post-operatively. It is safe to let the arm hang at the side and take a shower and put on a shirt without the sling on. Just make sure that you do not use the operative are to reach out and grab anything as that may damage the repair. When you are done showering and getting dressed please return the operative arm to the sling. Unless noted otherwise in your discharge paperwork, Dr. Juan Carlos Mathur uses absorbable sutures so they do not need to be removed. Dr. Netta Perez physician assistant (PA) will see you in the office a few days after the procedure to review the intra-operative findings and to initiate physical therapy if appropriate.   A post-operative appointment should have been scheduled for you already, but if for some reason this did not happen, please call the office to make one. Physical therapy is important after nearly all shoulder surgeries and a detailed rehabilitation plan based on the specific intra-operative findings and procedures will be provided to your therapist at the first post-operative office visit. Most patients have post-operative therapy appointments scheduled pre-operatively, but if you do not, that will be handled at the first post-operative office visit. Unless expressly directed otherwise it is safe to remove the sling even the first day after the surgery and let the arm hang by the side. This allows patients to shower and even put a shirt on (bad arm in the sleeve first). It is also safe to flex and extend their wrist, hand and fingers as much as possible when the block wears off. These simple motions can serve to pump fluid out of the forearm and decrease swelling in the arm.

## 2023-08-23 ENCOUNTER — ANESTHESIA EVENT (OUTPATIENT)
Dept: PERIOP | Facility: AMBULARY SURGERY CENTER | Age: 23
End: 2023-08-23
Payer: COMMERCIAL

## 2023-08-24 ENCOUNTER — RA CDI HCC (OUTPATIENT)
Dept: OTHER | Facility: HOSPITAL | Age: 23
End: 2023-08-24

## 2023-08-24 NOTE — PROGRESS NOTES
[address] coding opportunities       Chart reviewed, no opportunity found: CHART REVIEWED, NO OPPORTUNITY FOUND        Patients Insurance        Commercial Insurance: Abhijit Degroot

## 2023-08-29 ENCOUNTER — TELEPHONE (OUTPATIENT)
Dept: OBGYN CLINIC | Facility: OTHER | Age: 23
End: 2023-08-29

## 2023-08-29 NOTE — PRE-PROCEDURE INSTRUCTIONS
Medication instructions for day surgery reviewed. Please use only a sip of water to take your instructed medications. Avoid all over the counter vitamins, supplements and NSAIDS for one week prior to surgery per anesthesia guidelines. Tylenol is ok to take as needed. You will receive a call one business day prior to surgery with an arrival time and hospital directions. If your surgery is scheduled on a Monday, the hospital will be calling you on the Friday prior to your surgery. If you have not heard from anyone by 8pm, please call the hospital supervisor through the hospital  at [phone]. Caro Longl [phone]). Do not eat or drink anything after midnight the night before your surgery, including candy, mints, lifesavers, or chewing gum. Do not drink alcohol 24hrs before your surgery. Try not to smoke at least 24hrs before your surgery. Follow the pre surgery showering instructions as listed in the [facility] Surgical Experience Booklet” or otherwise provided by your surgeon's office. Do not shave the surgical area 24 hours before surgery. Do not apply any lotions, creams, including makeup, cologne, deodorant, or perfumes after showering on the day of your surgery. No contact lenses, eye make-up, or artificial eyelashes. Remove nail polish, including gel polish, and any artificial, gel, or acrylic nails if possible. Remove all jewelry including rings and body piercing jewelry. Wear causal clothing that is easy to take on and off. Consider your type of surgery. Keep any valuables, jewelry, piercings at home. Please bring any specially ordered equipment (sling, braces) if indicated. Arrange for a responsible person to drive you to and from the hospital on the day of your surgery. Visitor Guidelines discussed. Call the surgeon's office with any new illnesses, exposures, or additional questions prior to surgery.     Please reference your [facility] Surgical Experience Booklet” for additional information to prepare for your upcoming surgery. No outpatient medications have been marked as taking for the 9/6/23 encounter [facility] Encounter). Pt will get antibacterial soap.

## 2023-08-29 NOTE — TELEPHONE ENCOUNTER
Caller: Anupama Coronel    Doctor: Frank Landin    Reason for call: Received a message that he needed to come in for a sling  And to sign consents, Patient says he was already fitted for his sling and he is pretty sure they had him sign his consents also    Call back#: [phone]

## 2023-08-30 ENCOUNTER — TELEPHONE (OUTPATIENT)
Dept: OBGYN CLINIC | Facility: OTHER | Age: 23
End: 2023-08-30

## 2023-08-31 ENCOUNTER — OFFICE VISIT (OUTPATIENT)
Dept: FAMILY MEDICINE CLINIC | Facility: CLINIC | Age: 23
End: 2023-08-31
Payer: COMMERCIAL

## 2023-08-31 VITALS
HEIGHT: 77 IN | RESPIRATION RATE: 18 BRPM | SYSTOLIC BLOOD PRESSURE: 126 MMHG | TEMPERATURE: 98 F | WEIGHT: 208.5 LBS | OXYGEN SATURATION: 98 % | HEART RATE: 61 BPM | DIASTOLIC BLOOD PRESSURE: 80 MMHG | BODY MASS INDEX: 24.62 KG/M2

## 2023-08-31 DIAGNOSIS — L65.9 ALOPECIA: Primary | ICD-10-CM

## 2023-08-31 PROCEDURE — 99212 OFFICE O/P EST SF 10 MIN: CPT | Performed by: FAMILY MEDICINE

## 2023-08-31 RX ORDER — TRIAMCINOLONE ACETONIDE 5 MG/G
CREAM TOPICAL 2 TIMES DAILY
Qty: 15 G | Refills: 2 | Status: SHIPPED | OUTPATIENT
Start: 2023-08-31

## 2023-08-31 NOTE — PROGRESS NOTES
FAMILY PRACTICE OFFICE VISIT       NAME: Zain Saab  AGE: 25 y.o. SEX: male       : 2000        MRN: [id number]    DATE: 2023  TIME: 11:22 AM    Assessment and Plan     Problem List Items Addressed This Visit        Musculoskeletal and Integument    Alopecia - Primary     Alopecia. Patient with lingering 2 small patches of hair loss on occiput area of his head. I suspect patient may have had previous folliculitis. He was given prescription for triamcinolone cream to apply twice daily to the affected area. Patient will call within 1 to 2 months if hair growth has not begun to restart. Relevant Medications    triamcinolone (KENALOG) 0.5 % cream           Chief Complaint     Chief Complaint   Patient presents with   • hair loos      X 1 month        History of Present Illness     Patient in the office after noticing a few weeks ago that he developed small red nodule on 2 areas of his scalp along occiput area. The lesions have begun to resolve however it left him with hair loss in those areas. Patient denies any recent illness. He has no family history of baldness. He does not take any medications on a regular basis. He normally uses head and shoulders shampoo to wash his hair      Review of Systems   Review of Systems   Constitutional: Negative.     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   • Alopecia       Past Medical History:  Past Medical History:   Diagnosis Date   • Fractures     stress fx of Right foot and toe fx  : Pinkie finger right FX   • Motion sickness        Past Surgical History:  Past Surgical History:   Procedure Laterality Date   • CYST REMOVAL      upper Gum    • FL INJECTION RIGHT ELBOW (ARTHROGRAM)  12/16/2019   • FL INJECTION RIGHT SHOULDER (ARTHROGRAM)  9/24/2021   • FL INJECTION RIGHT SHOULDER (ARTHROGRAM)  7/24/2023   • NV SURGICAL ARTHROSCOPY SHOULDER CAPSULORRHAPHY Left 9/12/2018    Procedure: SHOULDER ARTHROSCOPY WITH LABRAL POSTERIOR AND ANTERIOR REPAIR;  Surgeon: Maribell Grey MD;  Location: [facility]  MAIN [facility];  Service: Orthopedics       Family History:  Family History   Problem Relation Age of Onset   • Hypertension Mother    • Heart attack Maternal Grandfather        Social History:  Social History     Socioeconomic History   • Marital status: Single     Spouse name: Not on file   • Number of children: Not on file   • Years of education: Not on file   • Highest education level: Not on file   Occupational History   • Not on file   Tobacco Use   • Smoking status: Never   • Smokeless tobacco: Never   Vaping Use   • Vaping Use: Never used   Substance and Sexual Activity   • Alcohol use: Yes   • Drug use: No   • Sexual activity: Not Currently   Other Topics Concern   • Not on file   Social History Narrative   • Not on file     Social Determinants of Health     Financial Resource Strain: Not on file   Food Insecurity: Not on file   Transportation Needs: Not on file   Physical Activity: Not on file   Stress: Not on file   Social Connections: Not on file   Intimate Partner Violence: Not on file   Housing Stability: Not on file       Objective     Vitals:    08/31/23 0937   BP: 126/80   Pulse: 61   Resp: 18   Temp: 98 °F (36.7 °C)   SpO2: 98%     Wt Readings from Last 3 Encounters:   08/31/23 94.6 kg (208 lb 8 oz)   07/27/23 94.3 kg (208 lb)   06/08/23 95.7 kg (211 lb)       Physical Exam  Constitutional:       General: He is not in acute distress. Appearance: Normal appearance. He is not ill-appearing. Skin:     Comments: Patient with 2 small patches of alopecia on occiput of his scalp where previous red nodule had been located.   At this time there is no significant signs of infection or nodules. Skin of scalp appears to be normal with no unusual rash or irritation areas of hair loss are approximately. 1/2 cm x 1/2 cm   Neurological:      Mental Status: He is alert. Pertinent Laboratory/Diagnostic Studies:  No results found for: "GLUCOSE", "BUN", "CREATININE", "CALCIUM", "NA", "K", "CO2", "CL"  No results found for: "ALT", "AST", "GGT", "ALKPHOS", "BILITOT"    No results found for: "WBC", "HGB", "HCT", "MCV", "PLT"    No results found for: "TSH"    No results found for: "CHOL"  No results found for: "TRIG"  No results found for: "HDL"  No results found for: "LDLCALC"  No results found for: "HGBA1C"    Results for orders placed or performed in visit on 11/29/21   Novel Coronavirus (COVID-19), PCR [facility]    Specimen: Nose; Nares   Result Value Ref Range    SARS-CoV-2 Negative Negative       No orders of the defined types were placed in this encounter. ALLERGIES:  No Known Allergies    Current Medications     Current Outpatient Medications   Medication Sig Dispense Refill   • triamcinolone (KENALOG) 0.5 % cream Apply topically 2 (two) times a day 15 g 2     No current facility-administered medications for this visit.          Health Maintenance     Health Maintenance   Topic Date Due   • Hepatitis C Screening  Never done   • COVID-19 Vaccine (1) Never done   • HPV Vaccine (1 - Male 2-dose series) Never done   • HIV Screening  Never done   • Annual Physical  Never done   • PT PLAN OF CARE  04/12/2020   • Influenza Vaccine (1) 09/01/2023   • BMI: Adult  08/29/2024   • Depression Screening  08/31/2024   • DTaP,Tdap,and Td Vaccines (7 - Td or Tdap) 11/18/2032   • Pneumococcal Vaccine: Pediatrics (0 to 5 Years) and At-Risk Patients (6 to 59 Years)  Completed   • HIB Vaccine  Completed   • IPV Vaccine  Completed   • Hepatitis A Vaccine  Completed   • Meningococcal ACWY Vaccine  Completed     Immunization History   Administered Date(s) Administered   • DTaP,unspecified 2000, 01/16/2001, 04/10/2001, 10/17/2005   • Hep A, ped/adol, 2 dose 02/03/2010, 02/11/2011   • Hep B, Adolescent or Pediatric 04/10/2001, 07/26/2001, 01/25/2002   • HiB 2000, 01/16/2001, 04/10/2001, 10/12/2001   • INFLUENZA 11/16/2017, 11/10/2021   • IPV 2000, 01/16/2001, 01/25/2002, 10/17/2005   • Influenza, injectable, quadrivalent, preservative free 0.5 mL 11/10/2021, 11/18/2022   • MMR 10/12/2001, 10/13/2004   • Meningococcal ACWY, unspecified 02/16/2012, 11/17/2017   • Meningococcal B, Recombinant (Chuy Olivares) 11/16/2017, 08/14/2018   • Pneumococcal Conjugate 13-Valent 2000, 01/16/2001, 04/10/2001, 01/25/2002   • Tdap 02/16/2012, 11/18/2022   • Varicella 10/12/2001, 35/59/1841       Haleigh Roman MD

## 2023-08-31 NOTE — ASSESSMENT & PLAN NOTE
Alopecia. Patient with lingering 2 small patches of hair loss on occiput area of his head. I suspect patient may have had previous folliculitis. He was given prescription for triamcinolone cream to apply twice daily to the affected area. Patient will call within 1 to 2 months if hair growth has not begun to restart.

## 2023-09-06 ENCOUNTER — ANESTHESIA (OUTPATIENT)
Dept: PERIOP | Facility: AMBULARY SURGERY CENTER | Age: 23
End: 2023-09-06
Payer: COMMERCIAL

## 2023-09-06 ENCOUNTER — HOSPITAL ENCOUNTER (OUTPATIENT)
Facility: AMBULARY SURGERY CENTER | Age: 23
Setting detail: OUTPATIENT SURGERY
Discharge: HOME | End: 2023-09-06
Attending: ORTHOPAEDIC SURGERY | Admitting: ORTHOPAEDIC SURGERY
Payer: COMMERCIAL

## 2023-09-06 VITALS
HEART RATE: 63 BPM | RESPIRATION RATE: 18 BRPM | HEIGHT: 77 IN | SYSTOLIC BLOOD PRESSURE: 128 MMHG | OXYGEN SATURATION: 98 % | BODY MASS INDEX: 23.85 KG/M2 | TEMPERATURE: 97.5 F | WEIGHT: 202 LBS | DIASTOLIC BLOOD PRESSURE: 71 MMHG

## 2023-09-06 DIAGNOSIS — S43.431D SUPERIOR GLENOID LABRUM LESION OF RIGHT SHOULDER, SUBSEQUENT ENCOUNTER: Primary | ICD-10-CM

## 2023-09-06 DIAGNOSIS — L65.9 ALOPECIA: ICD-10-CM

## 2023-09-06 PROCEDURE — 29806 SHO ARTHRS SRG CAPSULORRAPHY: CPT | Performed by: ORTHOPAEDIC SURGERY

## 2023-09-06 PROCEDURE — C1713 ANCHOR/SCREW BN/BN,TIS/BN: HCPCS | Performed by: ORTHOPAEDIC SURGERY

## 2023-09-06 PROCEDURE — NC001 PR NO CHARGE: Performed by: ORTHOPAEDIC SURGERY

## 2023-09-06 PROCEDURE — C9290 INJ, BUPIVACAINE LIPOSOME: HCPCS | Performed by: ANESTHESIOLOGY

## 2023-09-06 PROCEDURE — 29807 SHO ARTHRS SRG RPR SLAP LES: CPT | Performed by: ORTHOPAEDIC SURGERY

## 2023-09-06 PROCEDURE — 29806 SHO ARTHRS SRG CAPSULORRAPHY: CPT | Performed by: PHYSICIAN ASSISTANT

## 2023-09-06 PROCEDURE — 29807 SHO ARTHRS SRG RPR SLAP LES: CPT | Performed by: PHYSICIAN ASSISTANT

## 2023-09-06 DEVICE — FIBERTAK WITH SUTURETAPE
Type: IMPLANTABLE DEVICE | Site: SHOULDER | Status: FUNCTIONAL
Brand: ARTHREX®

## 2023-09-06 RX ORDER — CEFAZOLIN SODIUM 2 G/50ML
2000 SOLUTION INTRAVENOUS ONCE
Status: COMPLETED | OUTPATIENT
Start: 2023-09-06 | End: 2023-09-06

## 2023-09-06 RX ORDER — HYDROMORPHONE HCL/PF 1 MG/ML
0.2 SYRINGE (ML) INJECTION
Status: DISCONTINUED | OUTPATIENT
Start: 2023-09-06 | End: 2023-09-06 | Stop reason: HOSPADM

## 2023-09-06 RX ORDER — ONDANSETRON 2 MG/ML
4 INJECTION INTRAMUSCULAR; INTRAVENOUS ONCE AS NEEDED
Status: DISCONTINUED | OUTPATIENT
Start: 2023-09-06 | End: 2023-09-06 | Stop reason: HOSPADM

## 2023-09-06 RX ORDER — OXYCODONE HYDROCHLORIDE 5 MG/1
5 TABLET ORAL EVERY 6 HOURS PRN
Qty: 15 TABLET | Refills: 0 | Status: SHIPPED | OUTPATIENT
Start: 2023-09-06 | End: 2023-09-10

## 2023-09-06 RX ORDER — FENTANYL CITRATE 50 UG/ML
INJECTION, SOLUTION INTRAMUSCULAR; INTRAVENOUS AS NEEDED
Status: DISCONTINUED | OUTPATIENT
Start: 2023-09-06 | End: 2023-09-06

## 2023-09-06 RX ORDER — ONDANSETRON 2 MG/ML
INJECTION INTRAMUSCULAR; INTRAVENOUS AS NEEDED
Status: DISCONTINUED | OUTPATIENT
Start: 2023-09-06 | End: 2023-09-06

## 2023-09-06 RX ORDER — OXYCODONE HYDROCHLORIDE 5 MG/1
10 TABLET ORAL EVERY 4 HOURS PRN
Status: DISCONTINUED | OUTPATIENT
Start: 2023-09-06 | End: 2023-09-06 | Stop reason: HOSPADM

## 2023-09-06 RX ORDER — ONDANSETRON 2 MG/ML
4 INJECTION INTRAMUSCULAR; INTRAVENOUS EVERY 6 HOURS PRN
Status: CANCELLED | OUTPATIENT
Start: 2023-09-06

## 2023-09-06 RX ORDER — LIDOCAINE HYDROCHLORIDE 10 MG/ML
INJECTION, SOLUTION EPIDURAL; INFILTRATION; INTRACAUDAL; PERINEURAL AS NEEDED
Status: DISCONTINUED | OUTPATIENT
Start: 2023-09-06 | End: 2023-09-06

## 2023-09-06 RX ORDER — PROPOFOL 10 MG/ML
INJECTION, EMULSION INTRAVENOUS AS NEEDED
Status: DISCONTINUED | OUTPATIENT
Start: 2023-09-06 | End: 2023-09-06

## 2023-09-06 RX ORDER — MIDAZOLAM HYDROCHLORIDE 2 MG/2ML
INJECTION, SOLUTION INTRAMUSCULAR; INTRAVENOUS AS NEEDED
Status: DISCONTINUED | OUTPATIENT
Start: 2023-09-06 | End: 2023-09-06

## 2023-09-06 RX ORDER — BUPIVACAINE HYDROCHLORIDE 5 MG/ML
INJECTION, SOLUTION EPIDURAL; INTRACAUDAL
Status: COMPLETED | OUTPATIENT
Start: 2023-09-06 | End: 2023-09-06

## 2023-09-06 RX ORDER — SODIUM CHLORIDE, SODIUM LACTATE, POTASSIUM CHLORIDE, CALCIUM CHLORIDE 600; 310; 30; 20 MG/100ML; MG/100ML; MG/100ML; MG/100ML
INJECTION, SOLUTION INTRAVENOUS CONTINUOUS PRN
Status: DISCONTINUED | OUTPATIENT
Start: 2023-09-06 | End: 2023-09-06

## 2023-09-06 RX ORDER — DEXAMETHASONE SODIUM PHOSPHATE 10 MG/ML
INJECTION, SOLUTION INTRAMUSCULAR; INTRAVENOUS AS NEEDED
Status: DISCONTINUED | OUTPATIENT
Start: 2023-09-06 | End: 2023-09-06

## 2023-09-06 RX ORDER — ACETAMINOPHEN 325 MG/1
650 TABLET ORAL EVERY 6 HOURS PRN
Status: CANCELLED | OUTPATIENT
Start: 2023-09-06

## 2023-09-06 RX ORDER — CHLORHEXIDINE GLUCONATE 0.12 MG/ML
15 RINSE ORAL ONCE
Status: DISCONTINUED | OUTPATIENT
Start: 2023-09-06 | End: 2023-09-06 | Stop reason: HOSPADM

## 2023-09-06 RX ORDER — PROPOFOL 10 MG/ML
INJECTION, EMULSION INTRAVENOUS CONTINUOUS PRN
Status: DISCONTINUED | OUTPATIENT
Start: 2023-09-06 | End: 2023-09-06

## 2023-09-06 RX ORDER — OXYCODONE HYDROCHLORIDE 5 MG/1
5 TABLET ORAL EVERY 4 HOURS PRN
Status: DISCONTINUED | OUTPATIENT
Start: 2023-09-06 | End: 2023-09-06 | Stop reason: HOSPADM

## 2023-09-06 RX ORDER — SODIUM CHLORIDE, SODIUM LACTATE, POTASSIUM CHLORIDE, CALCIUM CHLORIDE 600; 310; 30; 20 MG/100ML; MG/100ML; MG/100ML; MG/100ML
50 INJECTION, SOLUTION INTRAVENOUS CONTINUOUS
Status: DISCONTINUED | OUTPATIENT
Start: 2023-09-06 | End: 2023-09-06 | Stop reason: HOSPADM

## 2023-09-06 RX ADMIN — DEXAMETHASONE SODIUM PHOSPHATE 10 MG: 10 INJECTION, SOLUTION INTRAMUSCULAR; INTRAVENOUS at 07:34

## 2023-09-06 RX ADMIN — FENTANYL CITRATE 50 MCG: 50 INJECTION, SOLUTION INTRAMUSCULAR; INTRAVENOUS at 07:11

## 2023-09-06 RX ADMIN — SODIUM CHLORIDE, SODIUM LACTATE, POTASSIUM CHLORIDE, CALCIUM CHLORIDE: 600; 310; 30; 20 INJECTION, SOLUTION INTRAVENOUS at 07:02

## 2023-09-06 RX ADMIN — ONDANSETRON 4 MG: 2 INJECTION INTRAMUSCULAR; INTRAVENOUS at 07:40

## 2023-09-06 RX ADMIN — CEFAZOLIN SODIUM 2000 MG: 2 SOLUTION INTRAVENOUS at 07:33

## 2023-09-06 RX ADMIN — LIDOCAINE HYDROCHLORIDE 50 MG: 10 INJECTION, SOLUTION EPIDURAL; INFILTRATION; INTRACAUDAL; PERINEURAL at 07:33

## 2023-09-06 RX ADMIN — PROPOFOL 50 MG: 10 INJECTION, EMULSION INTRAVENOUS at 08:37

## 2023-09-06 RX ADMIN — PROPOFOL 100 MCG/KG/MIN: 10 INJECTION, EMULSION INTRAVENOUS at 07:33

## 2023-09-06 RX ADMIN — MIDAZOLAM HYDROCHLORIDE 2 MG: 2 INJECTION, SOLUTION INTRAMUSCULAR; INTRAVENOUS at 07:11

## 2023-09-06 RX ADMIN — FENTANYL CITRATE 50 MCG: 50 INJECTION, SOLUTION INTRAMUSCULAR; INTRAVENOUS at 08:38

## 2023-09-06 RX ADMIN — BUPIVACAINE HYDROCHLORIDE 7 ML: 5 INJECTION, SOLUTION EPIDURAL; INTRACAUDAL at 07:14

## 2023-09-06 RX ADMIN — PROPOFOL 250 MG: 10 INJECTION, EMULSION INTRAVENOUS at 07:33

## 2023-09-06 NOTE — H&P
Plan for right Shoulder Arthroscopy with SLAP repair. Vital Signs:  /83   Pulse 96   Temp 97.6 °F (36.4 °C) (Temporal)   Resp 18   Ht 6' 5" (1.956 m)   Wt 91.6 kg (202 lb)   SpO2 100%   BMI 23.95 kg/m²           Assessment  Diagnoses and all orders for this visit:     Superior glenoid labrum lesion of right shoulder, subsequent encounter        Discussion and Plan:     I reviewed the MRI arthrogram results with the patient and he does indeed have a SLAP lesion as clinically suspected. We discussed treatment options including continuing physical therapy and activity modification versus undergoing arthroscopic repair of a SLAP lesion. 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 He is going to consider all this, discussed with his family and his physical therapist Pedro Luis Garza and will let us know if he wishes to proceed forward with repair or if he wants to continue with physical therapy and try other interventions possibly a corticosteroid injection which I am not keen to provide to young patients but I am happy to discuss this in the future if he wishes to avoid surgical treatment for his right SLAP lesion.     ADDENDUM  The patient has contact the office and does wish to proceed forward with scheduling for arthroscopic SLAP repair of his right shoulder as we discussed. We did discuss the risks and benefits as well as alternatives to surgical treatment and given his desire to proceed forward with surgical intervention we will begin the scheduling process. A thorough discussion was had regarding the risks and benefits of the procedure (arthroscopic SLAP repair).   Risks discussed include but are not limited to infection, neurovascular injury, risk of anesthesia, recurrent tear of the labrum, need for further surgery, need for long head biceps tenodesis, stiffness requiring prolonged rehabilitation or return to the operating room for manipulation. After this discussion, all questions were answered and informed consent was obtained in the office for arthroscopic SLAP repair of the right shoulder.     Subjective:   Patient ID: Gabbi Landin is a 25 y.o. male        HPI     Patient returns for follow-up of his MRI arthrogram of the right shoulder. Symptoms have not changed, although he does state he starting to have some instability of his left shoulder which underwent posterior labral repair in the past and has been fine until he started hitting again and now having some episodes of instability.     The following portions of the patient's history were reviewed and updated as appropriate: allergies, current medications, past family history, past medical history, past social history, past surgical history and problem list.        Objective:  /81 (BP Location: Left arm, Patient Position: Sitting, Cuff Size: Adult)   Pulse (!) 54   Ht 6' 5" (1.956 m) Comment: verbal  Wt 94.3 kg (208 lb)   BMI 24.67 kg/m²         Right Shoulder Exam      Tenderness   The patient is experiencing no tenderness.     Range of Motion   Active abduction:  100 normal   Forward flexion:  180 normal      Muscle Strength   Abduction: 5/5   Internal rotation: 5/5   External rotation: 5/5      Tests   Gutierres test: negative  Impingement: negative  Drop arm: negative     Other   Erythema: absent  Scars: absent  Sensation: normal  Pulse: present     Comments:  + Obriens Sign           Physical Exam  Vitals and nursing note reviewed. Constitutional:       General: He is not in acute distress. Appearance: He is well-developed. HENT:      Head: Normocephalic and atraumatic.    Eyes:      Conjunctiva/sclera: Conjunctivae normal.   Cardiovascular:      Rate and Rhythm: Normal rate and regular rhythm. Pulses: Normal pulses. Heart sounds: Normal heart sounds. No murmur heard. Pulmonary:      Effort: Pulmonary effort is normal. No respiratory distress. Breath sounds: Normal breath sounds. Abdominal:      General: Abdomen is flat. Palpations: Abdomen is soft. Tenderness: There is no abdominal tenderness. Musculoskeletal:      Cervical back: Neck supple. Skin:     General: Skin is warm and dry. Capillary Refill: Capillary refill takes less than 2 seconds. Neurological:      Mental Status: He is alert.    Psychiatric:         Mood and Affect: Mood normal.                  I have personally reviewed pertinent films in PACS and my interpretation is as follows.     MRI arthrogram right shoulder shows a SLAP lesion with extension to the superior aspect of the posterior labrum

## 2023-09-06 NOTE — ANESTHESIA PREPROCEDURE EVALUATION
Procedure:  SHOULDER ARTHROSCOPY WITH SLAP REPAIR (Right: Shoulder)    Relevant Problems   NEURO/PSYCH   (+) Anxiety      Musculoskeletal and Integument   (+) Internal derangement of right shoulder      Other   (+) Glenohumeral internal rotation deficit of right shoulder        Physical Exam    Airway    Mallampati score: I  TM Distance: >3 FB  Neck ROM: full     Dental       Cardiovascular      Pulmonary      Other Findings        Anesthesia Plan  ASA Score- 1     Anesthesia Type- general with ASA Monitors. Additional Monitors:   Airway Plan: LMA. Comment: Discussed long acting interscalene nerve block for postoperative pain control with risks/benefits/alternatives. Patient made aware of possible postoperative shortness of breath related to transient hemidiaphragmatic paralysis. Discussed extremely low likelihood of transient or permanent nerve injury in the setting of ultrasound guidance and patient participation. Patient aware and would like to proceed. .       Plan Factors-Exercise tolerance (METS): >4 METS. Chart reviewed. Existing labs reviewed. Patient summary reviewed. Induction- intravenous. Postoperative Plan- Plan for postoperative opioid use. Planned trial extubation    Informed Consent- Anesthetic plan and risks discussed with patient and mother. I personally reviewed this patient with the CRNA. Discussed and agreed on the Anesthesia Plan with the CRNA. Lidia Phalen

## 2023-09-06 NOTE — OP NOTE
OPERATIVE REPORT  PATIENT NAME: Onesimo Tiwari    :  2000  MRN: [id number]  Pt Location: [facility] OR ROOM 06    SURGERY DATE: 2023     SURGEON: Aniyah Ayers MD     ASSISTANT: Eulas Felty PA-C     NOTE: Eulas Felty PA-C was present throughout the entire procedure and performed essential assistance with patient prepping, draping, positioning, suture management, wound closure, sterile dressing application and sling application, all under my direct supervision. NOTE: No qualified resident physician was available for assistance    PREOPERATIVE DIAGNOSIS:  Right Shoulder Posterior Glenoid Labrum Tear and SLAP Tear    POSTOPERATIVE DIAGNOSIS: Same    PROCEDURES: Surgical Arthroscopy Right Shoulder with SLAP Repair and Posterior Glenoid Labrum Repair    ANESTHESIA STAFF: Papa Ward MD     ANESTHESIA TYPE: General LMA with ultrasound guided interscalene block (Exparel). The interscalene block was provided by the anesthesia staff per my request for postoperative pain control and to decrease the use of postoperative narcotic medication for pain control. COMPLICATIONS: None    FINDINGS: Posterior Glenoid Labrum Tear and SLAP Tear    SPECIMEN(S):  None    ESTIMATED BLOOD LOSS: Minimal    INDICATION:  Briefly, the patient is a 25 y.o.  male with right shoulder pain. MRI arthrogram scan confirmed a SLAP tear and posterior labrum tear. The patient elected for arthroscopic treatment. Informed consent was obtained after a thorough discussion of the risks and benefits of the procedure, as well as alternatives to the procedure. OPERATIVE TECHNIQUE:  On the day of surgery, I identified the patient’s right shoulder and marked it with my initials. The patient was taken to the operating room where anesthesia was induced and 2 grams of IV Cefazolin were given.  The patient was examined in the supine position and was found to have full range of motion of the right shoulder with isolated posterior instability by load and shift testing. The patient was then positioned in the [address] [place] [address] chair position. All bony prominences were padded. The shoulder was prepped and draped in normal sterile fashion. After a time-out for safety, a standard posterior arthroscopic portal was made. Glenohumeral evaluation revealed intact glenohumeral articular cartilage with no loose bodies. There was a SLAP tear and a posterior labrum tear as expected on preoperative imaging. The anterior labrum did not have a tear and there was a Liza type complex with a thickened middle glenohumeral ligament. The undersurface of the supraspinatus and infraspinatus were intact as was subscapularis and long head of biceps had no partial tearing or subluxation. Starting at the SLAP tear site, the superior glenoid labrum was mobilized by elevation and the glenoid bone was debrided to allow for healing. Using two Arthrex All Suture FiberTak anchors, the SLAP tear was repaired to the glenoid with anatomic tension. A simple stitch from each anchor was utilized just anterior to the biceps and just posterior to the biceps taking care not to tension the [place] complex. After repair the long head biceps anchor was stable. The camera was then switched to the anterior superior viewing portal so we could better assess the posterior labral tear which did involve the posterior band of the infra glenohumeral ligament and was felt to be responsible for the posterior instability on exam.  The posterior glenoid labrum was elevated to allow for anatomic repair and the posterior glenoid bone was abraded for healing purposes. Three more Arthrex All Suture FiberTak anchors were then utilized anchor. After completing the second labral repair the remainder of the glenohumeral joint was evaluated found have no further pathology and the humeral head was centered. The area was then irrigated. Scope was withdrawn.  Wounds were closed with 4-0 Monocryl and Histoacryl. Sterile dressings and a sling with an abduction pillow was placed. The patient was awoken without complication and returned to the recovery room in good condition. We will see the patient back in the office next week to initiate therapy following posterior labral repair rehabilitation (primary pathology) protocol. At the end of procedure, the counts were correct.      PATIENT DISPOSITION:  Stable to PACU      SIGNATURE: Heather Almonte MD  DATE: September 6, 2023  TIME: 8:38 AM

## 2023-09-06 NOTE — DISCHARGE INSTR - AVS FIRST PAGE
You are being scheduled for a shoulder arthroscopy to treat your symptoms. Below are some instructions and information on what to expect before and after your surgery. Pre-Surgical Preparation for Arthroscopic Shoulder Surgery: You will be contacted the evening prior to your surgery to confirm the scheduled time of the procedure and when to arrive at the hospital.   Do not eat or drink anything after midnight the night before your surgery. Since you are having out-patient surgery, make sure that you have someone who can drive you home later in the day. Also, prepare that person for a long day, as the process of safely preparing for and recovering from the procedure is more time consuming than the actual procedure! As you will be in a sling after surgery, please wear or bring a loose fitting button-down shirt so that you can easily place this over the sling when you leave the surgical suite. This avoids having to place the operative arm in a sleeve. Most patients find that this is the easiest outfit to wear for the first week or so after surgery so you may want to plan accordingly. Most patients find that lying down in bed after shoulder surgery accentuates their discomfort. This is likely related to the effect of gravity on the swelling in the shoulder. As a result, most patients sleep better in a recliner or in bed with pillows propped up behind their back for the first few days or weeks after surgery. It is a good idea to plan for this ahead of time so there will be less hassle getting things set up the night after surgery. What to Expect After Arthroscopic Shoulder Surgery: It is normal to have swelling and discomfort in the shoulder for several days or a week after surgery. It is also normal to have a small amount of drainage from the surgical wounds (especially the first few days after surgery), as we put fluid into the shoulder to visualize the structures during surgery. It is NOT normal to have foul smelling, purulent drainage and if this is noted, please contact the office immediately or proceed to the emergency room for evaluation as this may indicate an infection. Applying ice bags to the shoulder may help with pain that is not controlled by the regional block. Ice should be applied 20-30 minutes at a time, every hour or two. Make sure to put a thin towel or T-shirt next to your skin to avoid direct contact of the ice with the skin. Icing is most helpful in the first 48 hours, although many people find that continuing past this time frame lessens their postoperative pain. Please note that your post-operative dressing is not conductive to ice, so if you need to, it is okay to remove that dressing even the night after surgery and place band-aids over the wounds in order for the ice to take effect. Pain Control    Most patients will receive a nerve block, the local anesthetic may keep your whole arm numb for up to 4 days. You will be given a prescription for narcotic pain medication when you are discharged from the hospital.  With the newer nerve block that is being utilized, patients are rarely requiring the use of this narcotic pain medication. If you find you do not tolerate that type of pain medicine well, call our office and we will try another one. In addition to the narcotic pain medication, it is safe to use an anti-inflammatory (unless the patient has a medical condition that would not allow safe use of this mediation). This includes the Advil, Motrin, Ibuprofen and Alleve category of medications. Simply follow the over the counter dosing on the package and use as indicated as another adjunct. Importantly since these medications are all very similar, use only one of them. Tylenol is a separate medication that can be utilized as well and can be taken at the same time as the other medication or given in a "staggered" manner.   Just make sure that you follow the dosing on the over the counter bottle instructions. Also make sure that the pain medication prescribed by Dr Toshia Boateng team does not contain acetaminophen (this is found in Percocet and Vicodin). Typically we do not prescribe those types of pain medications but if for some reason that has been prescribed DO NOT add more Tylenol (acetaminophen) as you could end up taking too much of that medication. As mentioned above, most patients find that lying down accentuates their discomfort. You might sleep better in a recliner, or propped up in bed. Dr. José Miguel Gamboa encourages patients to safely ambulate around the house as much as possible in the first few days after the procedure as this can help with blood circulation in the legs. While the incidence of blood clots is very rare following shoulder surgery, early ambulation is a great way to help decrease the already low rate. 24 hours after the surgery you may remove the bandage and cover incisions with Band-Aids if needed. At that time you may shower, the wounds will have a surgical glue that will protect them from shower water but do not submerge your incisions directly (bathing or swimming) until at least 2 weeks post-operatively. It is safe to let the arm hang at the side and take a shower and put on a shirt without the sling on. Just make sure that you do not use the operative are to reach out and grab anything as that may damage the repair. When you are done showering and getting dressed please return the operative arm to the sling. Unless noted otherwise in your discharge paperwork, Dr. José Miguel Gamboa uses absorbable sutures so they do not need to be removed. Dr. Omega Mcginnis physician assistant (PA) will see you in the office a few days after the procedure to review the intra-operative findings and to initiate physical therapy if appropriate.   A post-operative appointment should have been scheduled for you already, but if for some reason this did not happen, please call the office to make one. Physical therapy is important after nearly all shoulder surgeries and a detailed rehabilitation plan based on the specific intra-operative findings and procedures will be provided to your therapist at the first post-operative office visit. Most patients have post-operative therapy appointments scheduled pre-operatively, but if you do not, that will be handled at the first post-operative office visit. Unless expressly directed otherwise it is safe to remove the sling even the first day after the surgery and let the arm hang by the side. This allows patients to shower and even put a shirt on (bad arm in the sleeve first). It is also safe to flex and extend their wrist, hand and fingers as much as possible when the block wears off. These simple motions can serve to pump fluid out of the forearm and decrease swelling in the arm.

## 2023-09-06 NOTE — ANESTHESIA PROCEDURE NOTES
Peripheral Block    Patient location during procedure: holding area  Start time: 9/6/2023 7:14 AM  Reason for block: at surgeon's request and post-op pain management  Staffing  Performed by: Shea Moser MD  Authorized by: Shea Moser MD    Preanesthetic Checklist  Completed: patient identified, IV checked, site marked, risks and benefits discussed, surgical consent, monitors and equipment checked, pre-op evaluation and timeout performed  Peripheral Block  Patient position: sitting  Prep: ChloraPrep  Patient monitoring: frequent blood pressure checks, continuous pulse oximetry and heart rate  Block type: Interscalene  Laterality: right  Injection technique: single-shot  Procedures: ultrasound guided, Ultrasound guidance required for the procedure to increase accuracy and safety of medication placement and decrease risk of complications.   Ultrasound permanent image savedbupivacaine (PF) (MARCAINE) 0.5 % injection 20 mL - Perineural   7 mL - 9/6/2023 7:14:00 AM  bupivacaine liposomal (EXPAREL) 1.3 % injection 20 mL - Perineural   20 mL - 9/6/2023 7:14:00 AM  Needle  Needle type: Stimuplex   Needle gauge: 20 G  Needle length: 4 in  Needle localization: anatomical landmarks and ultrasound guidance  Assessment  Injection assessment: incremental injection, frequent aspiration, injected with ease, negative aspiration, negative for heart rate change, no paresthesia on injection, no symptoms of intraneural/intravenous injection and needle tip visualized at all times  Paresthesia pain: none  Post-procedure:  site cleaned  patient tolerated the procedure well with no immediate complications

## 2023-09-11 ENCOUNTER — EVALUATION (OUTPATIENT)
Dept: PHYSICAL THERAPY | Facility: REHABILITATION | Age: 23
End: 2023-09-11
Payer: COMMERCIAL

## 2023-09-11 ENCOUNTER — OFFICE VISIT (OUTPATIENT)
Dept: OBGYN CLINIC | Facility: OTHER | Age: 23
End: 2023-09-11

## 2023-09-11 VITALS
DIASTOLIC BLOOD PRESSURE: 85 MMHG | HEIGHT: 77 IN | BODY MASS INDEX: 24.58 KG/M2 | SYSTOLIC BLOOD PRESSURE: 129 MMHG | WEIGHT: 208.2 LBS | HEART RATE: 67 BPM

## 2023-09-11 DIAGNOSIS — Z47.89 AFTERCARE FOLLOWING SURGERY OF THE MUSCULOSKELETAL SYSTEM: Primary | ICD-10-CM

## 2023-09-11 DIAGNOSIS — S43.431D SUPERIOR GLENOID LABRUM LESION OF RIGHT SHOULDER, SUBSEQUENT ENCOUNTER: ICD-10-CM

## 2023-09-11 PROCEDURE — 97110 THERAPEUTIC EXERCISES: CPT | Performed by: PHYSICAL THERAPIST

## 2023-09-11 PROCEDURE — 97162 PT EVAL MOD COMPLEX 30 MIN: CPT | Performed by: PHYSICAL THERAPIST

## 2023-09-11 PROCEDURE — 99024 POSTOP FOLLOW-UP VISIT: CPT | Performed by: PHYSICIAN ASSISTANT

## 2023-09-11 NOTE — LETTER
September 11, 2023     Patient: Alexus Ibarra  YOB: 2000  Date of Visit: 9/11/2023      To Whom it May Concern:    Alexus Ibarra is under my professional care. Elsie Man was seen in my office on 9/11/2023. Eslie Man had surgery on 9/6/2023. He may return to school tomorrow. He will be in a sling for 3 more weeks. Follow-up will be in 6 weeks to further assess his progress. If you have any questions or concerns, please don't hesitate to call.          Sincerely,          Sybil Novoa PA-C        CC: No Recipients

## 2023-09-11 NOTE — PROGRESS NOTES
PT Evaluation     Today's date: 2023  Patient name: Yanni Godoy  : 2000  MRN: [id number]  Referring provider: Brodie Aggarwal  Dx:   Encounter Diagnosis     ICD-10-CM    1. Superior glenoid labrum lesion of right shoulder, subsequent encounter  S43.431D Ambulatory Referral to Physical Therapy          Start Time: 935  Stop Time: 1015  Total time in clinic (min): 40 minutes    Assessment  Assessment details: Yanni Godoy is a 25 y.o. male presenting to outpatient physical therapy on 23 with referral from MD after right posterior labral repair as well as SLAP repair on 23. Upon evaluation, Kenny Sebastian demonstrates impaired shoulder AROM, PROM, strength and pain. The listed impairments and functional limitation are effecting Kenny Sebastian ability to function at prior level. They can continue to benefit from physical therapy services at this times in order to address the above discussed impairments and functional limitation in order to allow for a return to premorbid status.     HEP: pendulums, scap retraction, elbow AAROM  Impairments: abnormal muscle firing, abnormal muscle tone, abnormal or restricted ROM, abnormal movement, activity intolerance, impaired physical strength and pain with function  Understanding of Dx/Px/POC: good   Prognosis: good    Plan  Patient would benefit from: skilled PT  Planned modality interventions: thermotherapy: hydrocollator packs  Planned therapy interventions: joint mobilization, manual therapy, ADL training, neuromuscular re-education, home exercise program, therapeutic exercise, therapeutic activities, strengthening, patient education and functional ROM exercises  Frequency: 2x week  Duration in weeks: 12  Treatment plan discussed with: patient        Subjective Evaluation    History of Present Illness  Mechanism of injury: Kenny Sebastian is a 25 y.o. male presenting to physical therapy on 23 with referral from MD after right posterior labral repair and SLAP repair on 9/6/23. Patient presents today with pain controlled, wearing sling. Patient Goals  Patient goals for therapy: decreased pain and increased strength  Patient goal: return to baseball throwing, hitting    Diagnostic Tests  MRI studies: abnormal  Treatments  Previous treatment: physical therapy    Short Term Goals:   1. Patient will be Independent with hep  2. Patient will improve pain with activity by 50%  3. Patient will report GROC 50% or greater  4. Patient will have PROM per protocol at 6 wk  5. Patient will have AROM per protocol at 6 wk      Long Term Goals:   1. Patient will improve FOTO to greater then goal  2. Patient will improve pain with activity to 2/10 or less  3. Patient will continue with HEP independence to allow for decreased future reoccurrence of pain and loss in function  4. Patient will report GROC 75% or greater  5. Patient will have 4+/5 all planes shoulder  6. Patient will throw a baseball pain free  7. Patient will make 10 throws at 75% from 60 ft pain free  8. Patient will resume upper extremity lifting at gym pain free        Objective     Posture: depressed scap b/l      Palpation: TTP anterior pec           MMT         AROM          PROM    Shoulder       L       R        L           R      L     R   Flex. 90   Abd. 70   IR.      0   ER.       30            Rhomboid         Mid Trap         Low Trap         Serratus         Infraspnatus         Teres Major         Sub Scap                         Precautions: Surgery 9/6/23 - see protocol       Manuals 9/11                                                                Neuro Re-Ed                                                                                                        Ther Ex 9/11            PROM AB            pendulum Hand 30"            Elbow aarom 10X            Scap ret 15X                                                                Ther Activity Gait Training                                       Modalities

## 2023-09-11 NOTE — PROGRESS NOTES
Assessment:       1. Aftercare following surgery of the musculoskeletal system          Plan:        Patient is doing well postoperatively. Operative note, images, and posterior labrum repair rehab protocol were discussed. All questions were addressed to the patient's satisfaction. Patient has an appointment with PT. an out of school note has been placed in his chart. An out of school note has been placed in the chart. Follow-up will be in 6 weeks to assess patient's progress. Subjective:     Patient ID: Trevon Wheeler is a 25 y.o. male. Chief Complaint:    HPI    The patient presents to the office for follow-up status post right shoulder arthroscopy with SLAP repair and posterior labral repair on 9/6/2023. He reports his pain is controlled. He has no residual paresthesia following anesthetic block. Social History     Occupational History   • Not on file   Tobacco Use   • Smoking status: Never   • Smokeless tobacco: Never   Vaping Use   • Vaping Use: Never used   Substance and Sexual Activity   • Alcohol use: Yes   • Drug use: No   • Sexual activity: Not Currently      Review of Systems   Constitutional: Negative. Respiratory: Negative. Cardiovascular: Negative. Gastrointestinal: Negative. Musculoskeletal: Positive for myalgias. Negative for arthralgias. Skin: Negative for wound. Neurological: Positive for weakness. Negative for numbness. Psychiatric/Behavioral: Negative. Objective:     Ortho ExamPhysical Exam  HENT:      Head: Atraumatic. Cardiovascular:      Pulses: Normal pulses. Pulmonary:      Effort: Pulmonary effort is normal.   Musculoskeletal:      Comments: Range of motion right arm not tested. Skin:     General: Skin is warm and dry. Capillary Refill: Capillary refill takes less than 2 seconds. Comments: Surgical incisions dry and clean. Neurological:      Mental Status: He is alert and oriented to person, place, and time.       Sensory: No sensory deficit.    Psychiatric:         Mood and Affect: Mood normal.         Behavior: Behavior normal.

## 2023-09-11 NOTE — PATIENT INSTRUCTIONS
1. Sling as per protocol  2. PT per protocol  3.  Follow-up in 6 weeks       Posterior Labral Repair Post-Operative Rehabilitation Protocol  Harpreet Mccabe MD  [facility]  [address] [place] [address]  [phone]  Phase I: Immediate Post-Operative Period (Weeks 0-3 Days 0-21)    Goals: Diminish Inflammation and Pain   Protect Integrity of the Repair   Progress PROM   Become Independent in Modified ADLs    Precautions:   Patient to Remain in Sling at all Times except Dressing/Bathing/PT until Week 3   Avoid Stressing Repair  No Shoulder IR Past Neutral for 6 weeks    Week 0-1 (Days 0-14)   Instruct in Dressing/Bathing/Toileting within Precautions    AROM wrist, hand, fingers    PROM elbow flexion/extension, shoulder supine forward elevation to 90°    Begin Codman Pendulums & Gentle Scapulothoracic Stabilization/Mobilization     Week 2-3 (Days 15-21)   Patient to Remain in Sling except for Exercises   Keep above but add the Following   PROM Shoulder, Flexion and Abduction 0-90°    PROM Shoulder ER with Elbow at the Side (measured relative to scapular plane)     Limit ER to 35°      No IR past 0°          Phase II: Moderate Protection Period (Weeks 3-6)    Goals: Allow Soft Tissue Healing   Gently Progress PROM to Regain Full PROM by Week 6  Except IR, none past Neutral until Week 6   No Glenohumeral Strengthening until Phase III    Precautions:   Sling for Patient Comfort Only   May begin to use Extremity for light ADLs (No Lifting Heavier than Coffee Cup)    Week 3-6 (Days 22-44)   Begin Lower Extremity and Core Strengthening, Light Cardio Training  PROM progressing to Full PROM at Week 6 (including ER with Shoulder in Abduction)   Begin Posterior Capsular Stretching if Necessary (added earlier if release done)   Progress Scapulothoracic Stabilization/Mobilization/Isometrics   If Full PROM Achieved, therapist may add AAROM in all Planes only after Week 4          Phase III: Early Strengthening (Weeks 6-12)    Goals: Gain Full AROM or Maintain if Applicable   Gradual Return of Shoulder/Scapular Strength, Power and Endurance   Prepare for Return to Functional Activities    Precautions:   No Lifting > 10 lbs, Sudden Lifting or Pushing, Overhead Lifting    Week 6-12   Progress Gentle PREs in all Planes of Movement (add Biceps PREs last to protect repair)   Add Light PNF Patterns (Bodyblade/Plyoball/etc. in non-Provocative Positions)  Progressive Rotator Cuff Strengthening   Progress Scapulothoracic Stabilization/Mobilization/Strengthening          Phase IV: Functional Rehabilitation (Weeks 12-16)    Week 12-16   Continue Rotator Cuff and Elbow Flexion strengthening   Emphasize Rhythm and Timing with PNFs (Bodyblade Overhead, Plyoball Throwing)   Stabilize Glenohumeral and Scapulothoracic Joint in Functional Position   Continue Total Body Conditioning (Core, Cardio and Lower Extremity)    Month 4-5   Continue Strengthening/Stabilization/PNF   Begin Interval Throwing Program if ROM/Strength Adequate (Overhead Throwers)    Month 6-7   Begin Throwing from the [place]chweiler (50-75%) as Tolerated (If Applicable)    Month 7-9   Progress to Full velocity as Tolerated         NB: This is a general program which may be modified by the surgeon or the therapist in consultation with the surgeon based on intra-operative findings, additional procedures preformed, repair stability, and patient biological factors. If in doubt, please check with my office for individual patient specifics. The ultimate goal of the surgery and rehabilitation is to get the labral tear to heal with the least residual functional deficit of the shoulder due to stiffness.

## 2023-09-13 ENCOUNTER — OFFICE VISIT (OUTPATIENT)
Dept: PHYSICAL THERAPY | Facility: REHABILITATION | Age: 23
End: 2023-09-13
Payer: COMMERCIAL

## 2023-09-13 DIAGNOSIS — S43.431D SUPERIOR GLENOID LABRUM LESION OF RIGHT SHOULDER, SUBSEQUENT ENCOUNTER: Primary | ICD-10-CM

## 2023-09-13 PROCEDURE — 97110 THERAPEUTIC EXERCISES: CPT | Performed by: PHYSICAL THERAPIST

## 2023-09-13 NOTE — PROGRESS NOTES
Daily Note     Today's date: 2023  Patient name: Clifford Marinelli  : 2000  MRN: [id number]  Referring provider: Bernard Holder  Dx:   Encounter Diagnosis     ICD-10-CM    1. Superior glenoid labrum lesion of right shoulder, subsequent encounter  S43.431D           Start Time: 1030  Stop Time: 1100  Total time in clinic (min): 30 minutes    Subjective: Patient reports feeling well, pain controlled and exercises going well. Objective: See treatment diary below  PROM: 90 flex and 90 ABD. ER 35 deg    Assessment: Tolerated treatment well. Patient ROM progressing as expected. Firm end feels. No HEP updates at this time. Plan: Continue per plan of care.       Precautions: Surgery 23 - see protocol       Manuals                                                                 Neuro Re-Ed                                                                                                        Ther Ex            PROM AB AB           pendulum Hand 30" 3'           Elbow aarom 10X            Scap ret 15X 5"x10                                                               Ther Activity                                       Gait Training                                       Modalities

## 2023-09-18 ENCOUNTER — OFFICE VISIT (OUTPATIENT)
Dept: PHYSICAL THERAPY | Facility: REHABILITATION | Age: 23
End: 2023-09-18
Payer: COMMERCIAL

## 2023-09-18 DIAGNOSIS — S43.431D SUPERIOR GLENOID LABRUM LESION OF RIGHT SHOULDER, SUBSEQUENT ENCOUNTER: Primary | ICD-10-CM

## 2023-09-18 PROCEDURE — 97110 THERAPEUTIC EXERCISES: CPT | Performed by: PHYSICAL THERAPIST

## 2023-09-18 NOTE — PROGRESS NOTES
Daily Note     Today's date: 2023  Patient name: Sadie Sanchez  : 2000  MRN: [id number]  Referring provider: Ranjan Harmon  Dx:   Encounter Diagnosis     ICD-10-CM    1. Superior glenoid labrum lesion of right shoulder, subsequent encounter  S43.431D           Start Time: 745  Stop Time: 0820  Total time in clinic (min): 35 minutes  PT session performed by Yazmin Estrella, SPT - supervised by Fatuma Roman PT, DPT and case discussed in detail throughout session. Subjective: Pt reports feeling good, no soreness/pain following last session. Objective: See treatment diary below. 110 ABD, 110 flexion, 30 ER. Firm end feel ABD/ER. No end feel assessed for flexion. Assessment: Tolerated treatment well. Pt tolerated PROM well with some tightness at his available end ranges w/ pt complaining ER as tightest/stretch. Reviewed and increased volume for exercises on HEP. Patient exhibited good technique with therapeutic exercises and would benefit from continued PT      Plan: Continue per plan of care.       Precautions: Surgery 23 - see protocol   HEP: AAROM elbow flexion, scap retractions, pendulums    Date           Manuals                                                                 Neuro Re-Ed                                                                                                        Ther Ex            PROM AB AB WM          pendulum Hand 30" 3' 4' all directions  HEP          Elbow aarom 10X  2x10  HEP          Scap ret 15X 5"x10 2x10 5" hold  HEP                                                              Ther Activity             Bike   6' lvl 5                       Gait Training                                       Modalities

## 2023-09-20 ENCOUNTER — OFFICE VISIT (OUTPATIENT)
Dept: PHYSICAL THERAPY | Facility: REHABILITATION | Age: 23
End: 2023-09-20
Payer: COMMERCIAL

## 2023-09-20 DIAGNOSIS — S43.431D SUPERIOR GLENOID LABRUM LESION OF RIGHT SHOULDER, SUBSEQUENT ENCOUNTER: Primary | ICD-10-CM

## 2023-09-20 PROCEDURE — 97140 MANUAL THERAPY 1/> REGIONS: CPT | Performed by: PHYSICAL THERAPIST

## 2023-09-20 PROCEDURE — 97110 THERAPEUTIC EXERCISES: CPT | Performed by: PHYSICAL THERAPIST

## 2023-09-20 NOTE — PROGRESS NOTES
Daily Note     Today's date: 2023  Patient name: Jose De Jesus Pak  : 2000  MRN: [id number]  Referring provider: Madeline Pedroza  Dx:   Encounter Diagnosis     ICD-10-CM    1. Superior glenoid labrum lesion of right shoulder, subsequent encounter  S43.431D           Start Time: 1145  Stop Time: 1220  Total time in clinic (min): 35 minutes  PT session performed by Carlos Santiago, SPT - supervised by Lynnette Guerrero PT, DPT and case discussed in detail throughout session. Subjective: Pt reports feeling good. He noticed some soreness the following day after last session but that has since resolved. Complained that his sling seems to be too low. Objective: See treatment diary below  ROM within protocol    Assessment: Tolerated treatment well. Pt continues to progress as expected. Added PROM w/ dowel to HEP. Adjusted sling to pt's comfort. Parascapular isometrics will be added NV. Patient demonstrated fatigue post treatment, exhibited good technique with therapeutic exercises and would benefit from continued PT      Plan: Continue per plan of care.       Precautions: Surgery 23 - see protocol   HEP: AAROM elbow flexion, scap retractions, pendulums, PROM for ABD, Flex, ER w/ dowel    Date          Manuals             STM    Pec AB                                                Neuro Re-Ed                                                                                                        Ther Ex            PROM AB AB WM WM          pendulum Hand 30" 3' 4' all directions  HEP          Elbow aarom 10X  2x10  HEP          Scap ret 15X 5"x10 2x10 5" hold  HEP          PROM w/ dowel    ER, flex, ABD 2x10 each, added to HEP                                                Ther Activity             Bike   6' lvl 5 6' lvl 5                      Gait Training                                       Modalities

## 2023-09-25 ENCOUNTER — OFFICE VISIT (OUTPATIENT)
Dept: FAMILY MEDICINE CLINIC | Facility: CLINIC | Age: 23
End: 2023-09-25
Payer: COMMERCIAL

## 2023-09-25 ENCOUNTER — OFFICE VISIT (OUTPATIENT)
Dept: PHYSICAL THERAPY | Facility: REHABILITATION | Age: 23
End: 2023-09-25
Payer: COMMERCIAL

## 2023-09-25 VITALS
WEIGHT: 203.5 LBS | TEMPERATURE: 98.2 F | HEART RATE: 65 BPM | RESPIRATION RATE: 18 BRPM | DIASTOLIC BLOOD PRESSURE: 70 MMHG | BODY MASS INDEX: 24.03 KG/M2 | HEIGHT: 77 IN | SYSTOLIC BLOOD PRESSURE: 112 MMHG | OXYGEN SATURATION: 99 %

## 2023-09-25 DIAGNOSIS — Z23 FLU VACCINE NEED: Primary | ICD-10-CM

## 2023-09-25 DIAGNOSIS — Z00.00 PERIODIC HEALTH ASSESSMENT, GENERAL SCREENING, ADULT: ICD-10-CM

## 2023-09-25 DIAGNOSIS — S43.431D SUPERIOR GLENOID LABRUM LESION OF RIGHT SHOULDER, SUBSEQUENT ENCOUNTER: Primary | ICD-10-CM

## 2023-09-25 DIAGNOSIS — F41.9 ANXIETY: ICD-10-CM

## 2023-09-25 PROCEDURE — 90471 IMMUNIZATION ADMIN: CPT

## 2023-09-25 PROCEDURE — 99212 OFFICE O/P EST SF 10 MIN: CPT | Performed by: FAMILY MEDICINE

## 2023-09-25 PROCEDURE — 97140 MANUAL THERAPY 1/> REGIONS: CPT | Performed by: PHYSICAL THERAPIST

## 2023-09-25 PROCEDURE — 99395 PREV VISIT EST AGE 18-39: CPT | Performed by: FAMILY MEDICINE

## 2023-09-25 PROCEDURE — 97110 THERAPEUTIC EXERCISES: CPT | Performed by: PHYSICAL THERAPIST

## 2023-09-25 PROCEDURE — 90686 IIV4 VACC NO PRSV 0.5 ML IM: CPT

## 2023-09-25 RX ORDER — DULOXETIN HYDROCHLORIDE 30 MG/1
30 CAPSULE, DELAYED RELEASE ORAL DAILY
Qty: 30 CAPSULE | Refills: 5 | Status: SHIPPED | OUTPATIENT
Start: 2023-09-25

## 2023-09-25 NOTE — PROGRESS NOTES
Daily Note     Today's date: 2023  Patient name: Jose Segovia  : 2000  MRN: [id number]  Referring provider: Nighat Olivo  Dx:   Encounter Diagnosis     ICD-10-CM    1. Superior glenoid labrum lesion of right shoulder, subsequent encounter  S43.431D           Start Time: 0750  Stop Time: 08  Total time in clinic (min): 39 minutes  PT session performed by Yodit Hancock, SPT - supervised by Zulma Tsai PT, DPT and case discussed in detail throughout session. Subjective: Pt reports no issues following last session. Shoulder continues to feel good with no pain or soreness. Objective: See treatment diary below. Right shoulder ABD 90 (firm end feel), ER 20 (firm end feel), flex 110 (no end feel)      Assessment: Tolerated treatment well. Pt continues to improve as expected. Demonstrates soft tissue tension in his pec, bicep tendon, and delts due to immobilization. Tension reduced following STM and PROM. Added in scapular isometrics with a row and A pattern which pt tolerated well. Added to HEP. Patient demonstrated fatigue post treatment, exhibited good technique with therapeutic exercises and would benefit from continued PT      Plan: Continue per plan of care.       Precautions: Surgery 23 - see protocol   HEP: AAROM elbow flexion, scap retractions, pendulums, PROM for ABD, Flex, ER w/ dowel, Row/A's scap iso w/ bands    Date         Manuals             STM    Pec AB Pec WM                                               Neuro Re-Ed                                                                                                        Ther Ex            PROM AB AB WM WM  WM        pendulum Hand 30" 3' 4' all directions  HEP          Elbow aarom 10X  2x10  HEP          Scap ret 15X 5"x10 2x10 5" hold  HEP          PROM w/ dowel    ER, flex, ABD 2x10 each, added to HEP ER, flex, ABD 2x10        Scap Iso     3lb band  Rows 1x10  A's 2x10 Ther Activity             Bike   6' lvl 5 6' lvl 5 6' lvl 6                     Gait Training                                       Modalities Stable

## 2023-09-25 NOTE — ASSESSMENT & PLAN NOTE
Anxiety. I had a long discussion with the patient regarding treatment options. We will initiate Cymbalta 30 mg once daily. He will call within 2 to 4 weeks if symptoms do not start to improve and we will titrate medication accordingly. We also discussed possible side effects.

## 2023-09-25 NOTE — ASSESSMENT & PLAN NOTE
Well adult. Overall the patient appears to be in stable health. He will obtain blood work as ordered for further evaluation.   We will make further recommendations pending results of test.

## 2023-09-25 NOTE — PROGRESS NOTES
FAMILY PRACTICE OFFICE VISIT       NAME: Zain Saab  AGE: 25 y.o. SEX: male       : 2000        MRN: [id number]    DATE: 2023  TIME: 12:44 PM    Assessment and Plan     Problem List Items Addressed This Visit        Other    Anxiety     Anxiety. I had a long discussion with the patient regarding treatment options. We will initiate Cymbalta 30 mg once daily. He will call within 2 to 4 weeks if symptoms do not start to improve and we will titrate medication accordingly. We also discussed possible side effects. Relevant Medications    DULoxetine (CYMBALTA) 30 mg delayed release capsule    Periodic health assessment, general screening, adult - Primary     Well adult. Overall the patient appears to be in stable health. He will obtain blood work as ordered for further evaluation. We will make further recommendations pending results of test.         Relevant Orders    CBC    Comprehensive metabolic panel    Lipid panel           Chief Complaint     Chief Complaint   Patient presents with   • Well Check     Physical    • Anxiety       History of Present Illness     Patient in the office for annual wellness exam.  He denies any recent illness. He does not take any medications on a regular basis. He is a non-smoker. Prior to right shoulder surgery he was exercising on a daily basis 5 to 6 days a week. Patient currently is recovering from surgery of his right shoulder to repair a labral tear. He is currently receiving physical therapy    Patient states he has felt a recurrence of his intermittent anxiety. As a teenager patient had been on Lexapro for anxiety but did not feel it was very effective. For period time symptoms improved when he was in college. Patient has 1 more class to take before graduating with his college degree. Patient would like to get into sports management business. Review of Systems   Review of Systems   Constitutional: Negative. HENT: Negative. Eyes: Negative. Respiratory: Negative. Cardiovascular: Negative. Gastrointestinal: Negative. Genitourinary: Negative. Musculoskeletal: Positive for arthralgias. Skin: Negative. Neurological: Negative. Psychiatric/Behavioral: The patient is nervous/anxious.         Active Problem List     Patient Active Problem List   Diagnosis   • Olecranon impingement syndrome of right elbow   • Anxiety   • Glenohumeral internal rotation deficit of right shoulder   • Internal derangement of right shoulder   • Superior glenoid labrum lesion of right shoulder   • Alopecia   • Aftercare following surgery of the musculoskeletal system   • Periodic health assessment, general screening, adult       Past Medical History:  Past Medical History:   Diagnosis Date   • Fractures     stress fx of Right foot and toe fx  : Pinkie finger right FX   • Motion sickness        Past Surgical History:  Past Surgical History:   Procedure Laterality Date   • CYST REMOVAL      upper Gum    • FL INJECTION RIGHT ELBOW (ARTHROGRAM)  12/16/2019   • FL INJECTION RIGHT SHOULDER (ARTHROGRAM)  9/24/2021   • FL INJECTION RIGHT SHOULDER (ARTHROGRAM)  7/24/2023   • [facility] SURGICAL ARTHROSCOPY SHOULDER CAPSULORRHAPHY Left 9/12/2018    Procedure: SHOULDER ARTHROSCOPY WITH LABRAL POSTERIOR AND ANTERIOR REPAIR;  Surgeon: Mei Paulino MD;  Location: [facility]  MAIN [facility];  Service: Orthopedics   • [facility] SURGICAL ARTHROSCOPY SHOULDER REPAIR SLAP LESION Right 9/6/2023    Procedure: SHOULDER ARTHROSCOPY WITH SLAP REPAIR AND POSTERIOR LABRAL REPAIR;  Surgeon: Vahid Medina MD;  Location: [facility] [facility];  Service: Orthopedics       Family History:  Family History   Problem Relation Age of Onset   • Hypertension Mother    • Heart attack Maternal Grandfather        Social History:  Social History     Socioeconomic History   • Marital status: Single     Spouse name: Not on file   • Number of children: Not on file   • Years of education: Not on file   • Highest education level: Not on file   Occupational History   • Not on file   Tobacco Use   • Smoking status: Never   • Smokeless tobacco: Never   Vaping Use   • Vaping Use: Never used   Substance and Sexual Activity   • Alcohol use: Yes   • Drug use: No   • Sexual activity: Not Currently   Other Topics Concern   • Not on file   Social History Narrative   • Not on file     Social Determinants of Health     Financial Resource Strain: Not on file   Food Insecurity: Not on file   Transportation Needs: Not on file   Physical Activity: Not on file   Stress: Not on file   Social Connections: Not on file   Intimate Partner Violence: Not on file   Housing Stability: Not on file       Objective     Vitals:    09/25/23 1102   BP: 112/70   Pulse: 65   Resp: 18   Temp: 98.2 °F (36.8 °C)   SpO2: 99%     Wt Readings from Last 3 Encounters:   09/25/23 92.3 kg (203 lb 8 oz)   09/11/23 94.4 kg (208 lb 3.2 oz)   09/06/23 91.6 kg (202 lb)       Physical Exam  Constitutional:       General: He is not in acute distress. Appearance: Normal appearance. He is not ill-appearing. HENT:      Head: Normocephalic and atraumatic. Eyes:      General:         Right eye: No discharge. Left eye: No discharge. Extraocular Movements: Extraocular movements intact. Conjunctiva/sclera: Conjunctivae normal.      Pupils: Pupils are equal, round, and reactive to light. Neck:      Vascular: No carotid bruit. Cardiovascular:      Rate and Rhythm: Normal rate and regular rhythm. Heart sounds: Normal heart sounds. No murmur heard. Pulmonary:      Effort: Pulmonary effort is normal.      Breath sounds: Normal breath sounds. No wheezing, rhonchi or rales. Abdominal:      General: Abdomen is flat. Bowel sounds are normal. There is no distension. Palpations: Abdomen is soft. Tenderness: There is no abdominal tenderness. There is no guarding or rebound. Musculoskeletal:      Right lower leg: No edema.       Left lower leg: No edema. Comments: Right shoulder in a shoulder immobilizer as recommended by orthopedist until upcoming orthopedic office visit in 2 weeks   Lymphadenopathy:      Cervical: No cervical adenopathy. Skin:     Findings: No rash. Neurological:      General: No focal deficit present. Mental Status: He is alert and oriented to person, place, and time. Cranial Nerves: No cranial nerve deficit. Psychiatric:         Mood and Affect: Mood normal.         Behavior: Behavior normal.         Thought Content: Thought content normal.         Judgment: Judgment normal.         Pertinent Laboratory/Diagnostic Studies:  No results found for: "GLUCOSE", "BUN", "CREATININE", "CALCIUM", "NA", "K", "CO2", "CL"  No results found for: "ALT", "AST", "GGT", "ALKPHOS", "BILITOT"    No results found for: "WBC", "HGB", "HCT", "MCV", "PLT"    No results found for: "TSH"    No results found for: "CHOL"  No results found for: "TRIG"  No results found for: "HDL"  No results found for: "LDLCALC"  No results found for: "HGBA1C"    Results for orders placed or performed in visit on 11/29/21   No[facility] Coron[facility]COVID-19[facility]    Specimen: Nose; Nares   Result Value Ref Range    SARS-CoV-2 Negative Negative       Orders Placed This Encounter   Procedures   • CBC   • Comprehensive metabolic panel   • Lipid panel       ALLERGIES:  No Known Allergies    Current Medications     Current Outpatient Medications   Medication Sig Dispense Refill   • DULoxetine (CYMBALTA) 30 mg delayed release capsule Take 1 capsule (30 mg total) by mouth daily 30 capsule 5   • triamcinolone (KENALOG) 0.5 % cream Apply topically 2 (two) times a day 15 g 2     No current facility-administered medications for this visit.          Health Maintenance     Health Maintenance   Topic Date Due   • Hepatitis C Screening  Never done   • COVID-19 Vaccine (1) Never done   • HPV Vaccine (1 - Male 2-dose series) Never done   • HIV Screening  Never done   • Annual Physical  Never done   • Influenza Vaccine (1) 09/01/2023   • PT PLAN OF CARE  10/11/2023   • Depression Screening  08/31/2024   • BMI: Adult  09/25/2024   • DTaP,Tdap,and Td Vaccines (7 - Td or Tdap) 11/18/2032   • Pneumococcal Vaccine: Pediatrics (0 to 5 Years) and At-Risk Patients (6 to 59 Years)  Completed   • HIB Vaccine  Completed   • IPV Vaccine  Completed   • Hepatitis A Vaccine  Completed   • Meningococcal ACWY Vaccine  Completed     Immunization History   Administered Date(s) Administered   • DTaP,unspecified 2000, 01/16/2001, 04/10/2001, 10/17/2005   • Hep A, ped/adol, 2 dose 02/03/2010, 02/11/2011   • Hep B, Adolescent or Pediatric 04/10/2001, 07/26/2001, 01/25/2002   • HiB 2000, 01/16/2001, 04/10/2001, 10/12/2001   • INFLUENZA 11/16/2017, 11/10/2021   • IPV 2000, 01/16/2001, 01/25/2002, 10/17/2005   • Influenza, injectable, quadrivalent, preservative free 0.5 mL 11/10/2021, 11/18/2022   • MMR 10/12/2001, 10/13/2004   • Meningococcal ACWY, unspecified 02/16/2012, 11/17/2017   • Meningococcal B, Recombinant (Valma Matsu) 11/16/2017, 08/14/2018   • Pneumococcal Conjugate 13-Valent 2000, 01/16/2001, 04/10/2001, 01/25/2002   • Tdap 02/16/2012, 11/18/2022   • Varicella 10/12/2001, 18/63/3790       Clarice Elise MD

## 2023-09-27 ENCOUNTER — OFFICE VISIT (OUTPATIENT)
Dept: PHYSICAL THERAPY | Facility: REHABILITATION | Age: 23
End: 2023-09-27
Payer: COMMERCIAL

## 2023-09-27 DIAGNOSIS — S43.431D SUPERIOR GLENOID LABRUM LESION OF RIGHT SHOULDER, SUBSEQUENT ENCOUNTER: Primary | ICD-10-CM

## 2023-09-27 PROCEDURE — 97140 MANUAL THERAPY 1/> REGIONS: CPT | Performed by: PHYSICAL THERAPIST

## 2023-09-27 PROCEDURE — 97110 THERAPEUTIC EXERCISES: CPT | Performed by: PHYSICAL THERAPIST

## 2023-09-27 NOTE — PROGRESS NOTES
Daily Note     Today's date: 2023  Patient name: David Altamirano  : 2000  MRN: [id number]  Referring provider: Yamilka Noyola  Dx:   Encounter Diagnosis     ICD-10-CM    1. Superior glenoid labrum lesion of right shoulder, subsequent encounter  S43.431D           Start Time: 1245  Stop Time: 1325  Total time in clinic (min): 40 minutes  PT session performed by Unique Rudd, SPT - supervised by Davy Gama PT, DPT and case discussed in detail throughout session. Subjective: Pt states he feels good following his last session w/ nothing to update. Objective: See treatment diary below. R shoulder ABD 90, flexion 110, ER 30      Assessment: Tolerated treatment well. Soft tissue tension has reduced following last session. Pt's ROM continues to be improving as expected. Increased scap isometrics which pt tolerated well. Patient demonstrated fatigue post treatment, exhibited good technique with therapeutic exercises and would benefit from continued PT      Plan: Continue per plan of care.       Precautions: Surgery 23 - see protocol   HEP: AAROM elbow flexion, scap retractions, pendulums, PROM for ABD, Flex, ER w/ dowel, Row/A's scap iso w/ bands    Date        Manuals             STM    Pec AB Pec WM Pec WM                                              Neuro Re-Ed                                                                                                        Ther Ex            PROM AB AB WM WM  WM WM       pendulum Hand 30" 3' 4' all directions  HEP          Elbow aarom 10X  2x10  HEP          Scap ret 15X 5"x10 2x10 5" hold  HEP          PROM w/ dowel    ER, flex, ABD 2x10 each, added to HEP ER, flex, ABD 2x10        Scap Iso     3lb band  Rows 1x10  A's 2x10 Rows 10 lb 3x10 A's 7 lbs 3x10                                 Ther Activity             Bike   6' lvl 5 6' lvl 5 6' lvl 6 6'                    Gait Training Modalities

## 2023-10-02 ENCOUNTER — OFFICE VISIT (OUTPATIENT)
Dept: PHYSICAL THERAPY | Facility: REHABILITATION | Age: 23
End: 2023-10-02
Payer: COMMERCIAL

## 2023-10-02 DIAGNOSIS — S43.431D SUPERIOR GLENOID LABRUM LESION OF RIGHT SHOULDER, SUBSEQUENT ENCOUNTER: Primary | ICD-10-CM

## 2023-10-02 PROCEDURE — 97140 MANUAL THERAPY 1/> REGIONS: CPT | Performed by: PHYSICAL THERAPIST

## 2023-10-02 PROCEDURE — 97110 THERAPEUTIC EXERCISES: CPT | Performed by: PHYSICAL THERAPIST

## 2023-10-02 NOTE — PROGRESS NOTES
Daily Note     Today's date: 10/2/2023  Patient name: Frank Winter  : 2000  MRN: [id number]  Referring provider: Madeline Alex  Dx:   Encounter Diagnosis     ICD-10-CM    1. Superior glenoid labrum lesion of right shoulder, subsequent encounter  S43.431D           Start Time: 5941  Stop Time: 0835  Total time in clinic (min): 40 minutes    Subjective: Pt states he is doing well. No updates from last session      Objective: See treatment diary below. R Shoulder F 150, , ER 85      Assessment: Tolerated treatment well. Pt's available ROM continues to improve as expected. Demonstrates tissue tension in pec minor, bicep tendon, and delts. Resolved w/ STM. Added in ER/IR iso metrics which pt tolerated well. Continue to progress pt per protocol. Patient demonstrated fatigue post treatment, exhibited good technique with therapeutic exercises and would benefit from continued PT      Plan: Continue per plan of care.       Precautions: Surgery 23 - see protocol   HEP: PROM for ABD, Flex, ER w/ dowel, Row/A's/ER/IR iso w/ bands, wall slides    Date  10      Manuals             STM    Pec AB Pec WM Pec WM Pec, delt, bicep tendon WM                                             Neuro Re-Ed                                                                                                        Ther Ex            PROM AB AB WM WM  WM WM WM      pendulum Hand 30" 3' 4' all directions  HEP          Elbow aarom 10X  2x10  HEP          Scap ret 15X 5"x10 2x10 5" hold  HEP          PROM w/ dowel    ER, flex, ABD 2x10 each, added to HEP ER, flex, ABD 2x10        Scap Iso     3lb band  Rows 1x10  A's 2x10 Rows 10 lb 3x10 A's 7 lbs 3x10 Rows 10 lb 2x10 2" hold  A's 7 lbs 2x10 2" hold      IR/ER       Gray band 2x10 2" hold      Wall slides       1x10 HEP       Ther Activity             Bike   6' lvl 5 6' lvl 5 6' lvl 6 6' 6' lvl 6                   Gait Training Modalities

## 2023-10-04 ENCOUNTER — OFFICE VISIT (OUTPATIENT)
Dept: PHYSICAL THERAPY | Facility: REHABILITATION | Age: 23
End: 2023-10-04
Payer: COMMERCIAL

## 2023-10-04 DIAGNOSIS — S43.431D SUPERIOR GLENOID LABRUM LESION OF RIGHT SHOULDER, SUBSEQUENT ENCOUNTER: Primary | ICD-10-CM

## 2023-10-04 PROCEDURE — 97140 MANUAL THERAPY 1/> REGIONS: CPT | Performed by: PHYSICAL THERAPIST

## 2023-10-04 PROCEDURE — 97110 THERAPEUTIC EXERCISES: CPT | Performed by: PHYSICAL THERAPIST

## 2023-10-04 NOTE — PROGRESS NOTES
Daily Note     Today's date: 10/4/2023  Patient name: Fernando Espinal  : 2000  MRN: [id number]  Referring provider: June Mcdaniels  Dx:   Encounter Diagnosis     ICD-10-CM    1. Superior glenoid labrum lesion of right shoulder, subsequent encounter  S43.431D           Start Time: 1200  Stop Time: 1240  Total time in clinic (min): 40 minutes    Subjective: Pt reports feeling good today. He began the appointment without his sling. No further updates. Objective: See treatment diary below. R shoulder , ER 85, flex 135      Assessment: Tolerated treatment well. Pt continues tolerating PROM well. Introduced pulleys which the pt had no issues with. Progressed pt to isometric ER/IR on [place] which the pt tolerated well. Pt demonstrated fatigue following ER. Assessed AROM for flexion. Pt demonstrates a slight deficit w/ end range which is in accordance w/ his healing times. Patient demonstrated fatigue post treatment, exhibited good technique with therapeutic exercises and would benefit from continued PT      Plan: Continue per plan of care.       Precautions: Surgery 23 - see protocol   HEP: PROM for ABD, Flex, ER w/ dowel, Row/A's/ER/IR iso w/ bands, wall slides    Date 9/11 9/13 9/18 9/20 9/25 9/27 10/2 10/4     Manuals             STM    Pec AB Pec WM Pec WM Pec, delt, bicep tendon WM Pec, bicep tendon WM                                            Neuro Re-Ed                                                                                                        Ther Ex            PROM AB AB WM WM  WM WM WM WM     pendulum Hand 30" 3' 4' all directions  HEP          Elbow aarom 10X  2x10  HEP          Scap ret 15X 5"x10 2x10 5" hold  HEP          PROM w/ dowel    ER, flex, ABD 2x10 each, added to HEP ER, flex, ABD 2x10        Scap Iso     3lb band  Rows 1x10  A's 2x10 Rows 10 lb 3x10 A's 7 lbs 3x10 Rows 10 lb 2x10 2" hold  A's 7 lbs 2x10 2" hold Row 15 lb 2x10 2"   A's 7 lbs 2x10 2" hold       IR/ER       Gray band 2x10 2" hold [place] IR 6 lbs 2x10 2" hold  [place] ER 5 lbs  2x10 2" hold     Wall slides       1x10 HEP       Ther Activity             Bike   6' lvl 5 6' lvl 5 6' lvl 6 6' 6' lvl 6 6' lvl 7                  Gait Training                                       Modalities

## 2023-10-09 ENCOUNTER — TELEPHONE (OUTPATIENT)
Dept: FAMILY MEDICINE CLINIC | Facility: CLINIC | Age: 23
End: 2023-10-09

## 2023-10-09 ENCOUNTER — OFFICE VISIT (OUTPATIENT)
Dept: PHYSICAL THERAPY | Facility: REHABILITATION | Age: 23
End: 2023-10-09
Payer: COMMERCIAL

## 2023-10-09 ENCOUNTER — APPOINTMENT (OUTPATIENT)
Dept: LAB | Facility: CLINIC | Age: 23
End: 2023-10-09
Payer: COMMERCIAL

## 2023-10-09 DIAGNOSIS — S43.431D SUPERIOR GLENOID LABRUM LESION OF RIGHT SHOULDER, SUBSEQUENT ENCOUNTER: Primary | ICD-10-CM

## 2023-10-09 DIAGNOSIS — Z00.00 PERIODIC HEALTH ASSESSMENT, GENERAL SCREENING, ADULT: ICD-10-CM

## 2023-10-09 DIAGNOSIS — Z11.3 SCREENING FOR STD (SEXUALLY TRANSMITTED DISEASE): ICD-10-CM

## 2023-10-09 DIAGNOSIS — Z11.3 SCREENING FOR STD (SEXUALLY TRANSMITTED DISEASE): Primary | ICD-10-CM

## 2023-10-09 LAB
ALBUMIN SERPL BCP-MCNC: 4.9 G/DL (ref 3.5–5)
ALP SERPL-CCNC: 65 U/L (ref 34–104)
ALT SERPL W P-5'-P-CCNC: 14 U/L (ref 7–52)
ANION GAP SERPL CALCULATED.3IONS-SCNC: 11 MMOL/L
AST SERPL W P-5'-P-CCNC: 17 U/L (ref 13–39)
BILIRUB SERPL-MCNC: 0.91 MG/DL (ref 0.2–1)
BUN SERPL-MCNC: 16 MG/DL (ref 5–25)
CALCIUM SERPL-MCNC: 10.2 MG/DL (ref 8.4–10.2)
CHLORIDE SERPL-SCNC: 100 MMOL/L (ref 96–108)
CHOLEST SERPL-MCNC: 171 MG/DL
CO2 SERPL-SCNC: 27 MMOL/L (ref 21–32)
CREAT SERPL-MCNC: 1.19 MG/DL (ref 0.6–1.3)
ERYTHROCYTE [DISTWIDTH] IN BLOOD BY AUTOMATED COUNT: 11.5 % (ref 11.6–15.1)
GFR SERPL CREATININE-BSD FRML MDRD: 85 ML/MIN/1.73SQ M
GLUCOSE SERPL-MCNC: 95 MG/DL (ref 65–140)
HCT VFR BLD AUTO: 45.7 % (ref 36.5–49.3)
HDLC SERPL-MCNC: 56 MG/DL
HGB BLD-MCNC: 15.6 G/DL (ref 12–17)
HIV 1+2 AB+HIV1 P24 AG SERPL QL IA: NORMAL
HIV 2 AB SERPL QL IA: NORMAL
HIV1 AB SERPL QL IA: NORMAL
HIV1 P24 AG SERPL QL IA: NORMAL
LDLC SERPL CALC-MCNC: 105 MG/DL (ref 0–100)
MCH RBC QN AUTO: 29.1 PG (ref 26.8–34.3)
MCHC RBC AUTO-ENTMCNC: 34.1 G/DL (ref 31.4–37.4)
MCV RBC AUTO: 85 FL (ref 82–98)
NONHDLC SERPL-MCNC: 115 MG/DL
PLATELET # BLD AUTO: 253 THOUSANDS/UL (ref 149–390)
PMV BLD AUTO: 11 FL (ref 8.9–12.7)
POTASSIUM SERPL-SCNC: 3.7 MMOL/L (ref 3.5–5.3)
PROT SERPL-MCNC: 8.2 G/DL (ref 6.4–8.4)
RBC # BLD AUTO: 5.36 MILLION/UL (ref 3.88–5.62)
SODIUM SERPL-SCNC: 138 MMOL/L (ref 135–147)
TRIGL SERPL-MCNC: 51 MG/DL
WBC # BLD AUTO: 4.8 THOUSAND/UL (ref 4.31–10.16)

## 2023-10-09 PROCEDURE — 36415 COLL VENOUS BLD VENIPUNCTURE: CPT

## 2023-10-09 PROCEDURE — 87389 HIV-1 AG W/HIV-1&-2 AB AG IA: CPT

## 2023-10-09 PROCEDURE — 97110 THERAPEUTIC EXERCISES: CPT | Performed by: PHYSICAL THERAPIST

## 2023-10-09 PROCEDURE — 85027 COMPLETE CBC AUTOMATED: CPT

## 2023-10-09 PROCEDURE — 80053 COMPREHEN METABOLIC PANEL: CPT

## 2023-10-09 PROCEDURE — 80061 LIPID PANEL: CPT

## 2023-10-09 NOTE — TELEPHONE ENCOUNTER
----- Message from Mya Dotson MD sent at 91/7/2763  5:26 PM EDT -----  All recent blood work stable for patient including HIV test

## 2023-10-09 NOTE — PROGRESS NOTES
Daily Note     Today's date: 10/9/2023  Patient name: Soniya Fermin  : 2000  MRN: [id number]  Referring provider: Kory Sutton  Dx:   Encounter Diagnosis     ICD-10-CM    1. Superior glenoid labrum lesion of right shoulder, subsequent encounter  S43.431D           Start Time: 07  Stop Time: 0830  Total time in clinic (min): 45 minutes  PT session performed by Caitlin Escoto, SPT - supervised by Maurilio Novoa PT, DPT and case discussed in detail throughout session. Subjective: Pt reports feeling good. No updates since last visit. Objective: See treatment diary below. R shoulder , F 150, ER 90 at 90-90      Assessment: Tolerated treatment well. Pt continues to progress as expected. Progressed pt to isotonic scapular/cuff exercises which pt tolerated well. Required VC/TC to improve scapular control w/ fatigue to prevent shoulder elevation and hiking. Patient demonstrated fatigue post treatment, exhibited good technique with therapeutic exercises and would benefit from continued PT      Plan: Continue per plan of care.       Precautions: Surgery 23 - see protocol   HEP: ER 90-90 AROM, Row/A's/ER/IR w/ bands, wall slides    Date 9/11 9/13 9/18 9/20 9/25 9/27 10/2 10/4 10/9    Manuals             STM    Pec AB Pec WM Pec WM Pec, delt, bicep tendon WM Pec, bicep tendon WM                                            Neuro Re-Ed             AROM         ER 1x10 HEP                                                                                  Ther Ex            PROM AB AB WM WM  WM WM WM WM WM    pendulum Hand 30" 3' 4' all directions  HEP          Elbow aarom 10X  2x10  HEP          Scap ret 15X 5"x10 2x10 5" hold  HEP          PROM w/ dowel    ER, flex, ABD 2x10 each, added to HEP ER, flex, ABD 2x10        Scap Iso     3lb band  Rows 1x10  A's 2x10 Rows 10 lb 3x10 A's 7 lbs 3x10 Rows 10 lb 2x10 2" hold  A's 7 lbs 2x10 2" hold Row 15 lb 2x10 2"   A's 7 lbs 2x10 2" hold       IR/ER       Gray band 2x10 2" hold [place] IR 6 lbs 2x10 2" hold  [place] ER 5 lbs  2x10 2" hold     Rows         20 lb [place] 3x10    A's         12 lb [id number]     ER/IR         ER 3 lb [place] 3x10  IR 7 lb 3x10                 Wall slides       1x10 HEP       Ther Activity             Bike   6' lvl 5 6' lvl 5 6' lvl 6 6' 6' lvl 6 6' lvl 7 6' lvl 8                 Gait Training                                       Modalities

## 2023-10-11 ENCOUNTER — APPOINTMENT (OUTPATIENT)
Dept: PHYSICAL THERAPY | Facility: REHABILITATION | Age: 23
End: 2023-10-11
Payer: COMMERCIAL

## 2023-10-16 ENCOUNTER — OFFICE VISIT (OUTPATIENT)
Dept: PHYSICAL THERAPY | Facility: REHABILITATION | Age: 23
End: 2023-10-16
Payer: COMMERCIAL

## 2023-10-16 DIAGNOSIS — S43.431D SUPERIOR GLENOID LABRUM LESION OF RIGHT SHOULDER, SUBSEQUENT ENCOUNTER: Primary | ICD-10-CM

## 2023-10-16 PROCEDURE — 97112 NEUROMUSCULAR REEDUCATION: CPT | Performed by: PHYSICAL THERAPIST

## 2023-10-16 PROCEDURE — 97110 THERAPEUTIC EXERCISES: CPT | Performed by: PHYSICAL THERAPIST

## 2023-10-16 NOTE — PROGRESS NOTES
Daily Note     Today's date: 10/16/2023  Patient name: Néstor Albarado  : 2000  MRN: [id number]  Referring provider: Evelyn Fitch  Dx: No diagnosis found. PT session performed by Emeka Turner, SPT - supervised by Michael Carranza PT, DPT and case discussed in detail throughout session. Subjective: Pt reports feeling well with no additional updates. Objective: See treatment diary below. R shoulder abd 180, flexion 170, ER 90      Assessment: Tolerated treatment well. Pt continues to progress as expect demonstrating increase in PROM. Pt continues to demonstrate limitations regarding ER/IR strength. He has compensatory patterns with thoracic rotation which are due to weakness and motor control. Added in banded ER walk out with ecc which improved his ER isotonic control and ROM due to improved motor recruitment. Added in bear holds for close chained proprioception and joint loading which pt tolerated well. Patient demonstrated fatigue post treatment, exhibited good technique with therapeutic exercises, and would benefit from continued PT      Plan: Continue per plan of care.       Precautions: Surgery 23 - see protocol   HEP: ER 90-90 AROM, Row/A's/ER/IR w/ bands, wall slides    Date 9/11 9/13 9/18 9/20 9/25 9/27 10/2 10/4 10/9 10/16   Manuals             STM    Pec AB Pec WM Pec WM Pec, delt, bicep tendon WM Pec, bicep tendon WM                                            Neuro Re-Ed             AROM         ER 1x10 HEP                                                                                  Ther Ex            PROM AB AB WM WM  WM WM WM WM WM WM   pendulum Hand 30" 3' 4' all directions  HEP          Elbow aarom 10X  2x10  HEP          Scap ret 15X 5"x10 2x10 5" hold  HEP          PROM w/ dowel    ER, flex, ABD 2x10 each, added to HEP ER, flex, ABD 2x10        Scap Iso     3lb band  Rows 1x10  A's 2x10 Rows 10 lb 3x10 A's 7 lbs 3x10 Rows 10 lb 2x10 2" hold  A's 7 lbs 2x10 2" hold Row 15 lb 2x10 2"   A's 7 lbs 2x10 2" hold       IR/ER       Gray band 2x10 2" hold [place] IR 6 lbs 2x10 2" hold  [place] ER 5 lbs  2x10 2" hold ER 3 lb [place] 3x10  IR 7 lb 3x10 ER 3 lb [place] 2x10  IR 10 lb 3x10  ER walkout with eccentric 1x10    Rows         20 lb [place] 3x10 20 lb [place] 3x10   A's         12 lb [id number]  15 lb [place] 3x10   Shoulder taps          Bear hold 2x30"   Wall slides       1x10 HEP    On red cord 2x10   Ther Activity             Bike   6' lvl 5 6' lvl 5 6' lvl 6 6' 6' lvl 6 6' lvl 7 6' lvl 8 6' lvl 8                Gait Training                                       Modalities

## 2023-10-17 ENCOUNTER — OFFICE VISIT (OUTPATIENT)
Dept: FAMILY MEDICINE CLINIC | Facility: CLINIC | Age: 23
End: 2023-10-17
Payer: COMMERCIAL

## 2023-10-17 VITALS
DIASTOLIC BLOOD PRESSURE: 86 MMHG | BODY MASS INDEX: 23.32 KG/M2 | SYSTOLIC BLOOD PRESSURE: 126 MMHG | TEMPERATURE: 98.7 F | OXYGEN SATURATION: 98 % | WEIGHT: 197.5 LBS | HEIGHT: 77 IN | HEART RATE: 98 BPM | RESPIRATION RATE: 18 BRPM

## 2023-10-17 DIAGNOSIS — Z20.2 EXPOSURE TO STD: Primary | ICD-10-CM

## 2023-10-17 DIAGNOSIS — F41.9 ANXIETY: ICD-10-CM

## 2023-10-17 PROCEDURE — 99213 OFFICE O/P EST LOW 20 MIN: CPT | Performed by: FAMILY MEDICINE

## 2023-10-17 NOTE — ASSESSMENT & PLAN NOTE
Exposure to STD.   Patient given prescription to obtain hepatitis C antibody test.  We will make further recommendations pending results of test.

## 2023-10-17 NOTE — PROGRESS NOTES
FAMILY PRACTICE OFFICE VISIT       NAME: Zain Saab  AGE: 21 y.o. SEX: male       : 2000        MRN: [id number]    DATE: 10/17/2023  TIME: 2:06 PM    Assessment and Plan     Problem List Items Addressed This Visit          Other    Anxiety     Anxiety. Patient previously had been on Lexapro up to 20 mg but felt it was ineffective at treating anxiety well. We will discontinue Cymbalta and switch to Zoloft 50 mg for patient to try once daily. Patient will report if he develops any new side effects. He will call if he does not see improvement in anxiety over the first 2 to 4 weeks         Relevant Medications    sertraline (Zoloft) 50 mg tablet    Exposure to STD - Primary     Exposure to STD. Patient given prescription to obtain hepatitis C antibody test.  We will make further recommendations pending results of test.         Relevant Orders    Hepatitis C antibody           Chief Complaint     Chief Complaint   Patient presents with    disscus medication        History of Present Illness     Patient in the office to review chronic condition. He states over the past 3 weeks since starting Effexor he has had what he feels is more anxiety in general with increased crying spells. He also experienced some weight loss due to decreased appetite. Is also had some anxiety regarding his overall health. Patient had recent HIV testing which was negative. Patient had unprotected sex in 2019 and was fearful of some exposure. He did test negative for herpes exposure shortly after unprotected sex. Patient has had immunizations to hepatitis B patient has had immunizations to hepatitis B vaccination. Patient would like to have blood work to rule out hepatitis C exposure. Review of Systems   Review of Systems   Constitutional:  Positive for unexpected weight change. Psychiatric/Behavioral:  Positive for dysphoric mood. The patient is nervous/anxious.         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    Internal derangement of right shoulder    Superior glenoid labrum lesion of right shoulder    Alopecia    Aftercare following surgery of the musculoskeletal system    Periodic health assessment, general screening, adult    Exposure to STD       Past Medical History:  Past Medical History:   Diagnosis Date    Fractures     stress fx of Right foot and toe fx  : Pinkie finger right FX    Motion sickness        Past Surgical History:  Past Surgical History:   Procedure Laterality Date    CYST REMOVAL      upper Gum     FL INJECTION RIGHT ELBOW (ARTHROGRAM)  12/16/2019    FL INJECTION RIGHT SHOULDER (ARTHROGRAM)  9/24/2021    FL INJECTION RIGHT SHOULDER (ARTHROGRAM)  7/24/2023    OH SURGICAL ARTHROSCOPY SHOULDER CAPSULORRHAPHY Left 9/12/2018    Procedure: SHOULDER ARTHROSCOPY WITH LABRAL POSTERIOR AND ANTERIOR REPAIR;  Surgeon: Lola Javier MD;  Location: [facility];  Service: Orthopedics    OH SURGICAL ARTHROSCOPY SHOULDER REPAIR SLAP LESION Right 9/6/2023    Procedure: SHOULDER ARTHROSCOPY WITH SLAP REPAIR AND POSTERIOR LABRAL REPAIR;  Surgeon: Ese Alcala MD;  Location: [facility];  Service: Orthopedics       Family History:  Family History   Problem Relation Age of Onset    Hypertension Mother     Heart attack Maternal Grandfather        Social History:  Social History     Socioeconomic History    Marital status: Single     Spouse name: Not on file    Number of children: Not on file    Years of education: Not on file    Highest education level: Not on file   Occupational History    Not on file   Tobacco Use    Smoking status: Never    Smokeless tobacco: Never   Vaping Use    Vaping Use: Never used   Substance and Sexual Activity    Alcohol use: Yes    Drug use: No    Sexual activity: Not Currently   Other Topics Concern    Not on file   Social History Narrative    Not on file     Social Determinants of Health     Financial Resource Strain: Not on file   Food Insecurity: Not on file   Transportation Needs: Not on file   Physical Activity: Not on file   Stress: Not on file   Social Connections: Not on file   Intimate Partner Violence: Not on file   Housing Stability: Not on file       Objective     Vitals:    10/17/23 1315   BP: 126/86   Pulse: 98   Resp: 18   Temp: 98.7 °F (37.1 °C)   SpO2: 98%     Wt Readings from Last 3 Encounters:   10/17/23 89.6 kg (197 lb 8 oz)   09/25/23 92.3 kg (203 lb 8 oz)   09/11/23 94.4 kg (208 lb 3.2 oz)       Physical Exam  Constitutional:       General: He is not in acute distress. Appearance: Normal appearance. He is not ill-appearing. Neurological:      Mental Status: He is alert. Mental status is at baseline. Psychiatric:         Mood and Affect: Mood normal.         Behavior: Behavior normal.         Thought Content:  Thought content normal.         Judgment: Judgment normal.         Pertinent Laboratory/Diagnostic Studies:  Lab Results   Component Value Date    BUN 16 10/09/2023    CREATININE 1.19 10/09/2023    CALCIUM 10.2 10/09/2023    K 3.7 10/09/2023    CO2 27 10/09/2023     10/09/2023     Lab Results   Component Value Date    ALT 14 10/09/2023    AST 17 10/09/2023    ALKPHOS 65 10/09/2023       Lab Results   Component Value Date    WBC 4.80 10/09/2023    HGB 15.6 10/09/2023    HCT 45.7 10/09/2023    MCV 85 10/09/2023     10/09/2023       No results found for: "TSH"    No results found for: "CHOL"  Lab Results   Component Value Date    TRIG 51 10/09/2023     Lab Results   Component Value Date    HDL 56 10/09/2023     Lab Results   Component Value Date    LDLCALC 105 (H) 10/09/2023     No results found for: "HGBA1C"    Results for orders placed or performed in visit on 10/09/23   CBC   Result Value Ref Range    WBC 4.80 4.31 - 10.16 Thousand/uL    RBC 5.36 3.88 - 5.62 Million/uL    Hemoglobin 15.6 12.0 - 17.0 g/dL    Hematocrit 45.7 36.5 - 49.3 %    MCV 85 82 - 98 fL    MCH 29.1 26.8 - 34.3 pg    MCHC 34.1 31.4 - 37.4 g/dL    RDW 11.5 (L) 11.6 - 15.1 %    Platelets 344 520 - 732 Thousands/uL    MPV 11.0 8.9 - 12.7 fL   Comprehensive metabolic panel   Result Value Ref Range    Sodium 138 135 - 147 mmol/L    Potassium 3.7 3.5 - 5.3 mmol/L    Chloride 100 96 - 108 mmol/L    CO2 27 21 - 32 mmol/L    ANION GAP 11 mmol/L    BUN 16 5 - 25 mg/dL    Creatinine 1.19 0.60 - 1.30 mg/dL    Glucose 95 65 - 140 mg/dL    Calcium 10.2 8.4 - 10.2 mg/dL    AST 17 13 - 39 U/L    ALT 14 7 - 52 U/L    Alkaline Phosphatase 65 34 - 104 U/L    Total Protein 8.2 6.4 - 8.4 g/dL    Albumin 4.9 3.5 - 5.0 g/dL    Total Bilirubin 0.91 0.20 - 1.00 mg/dL    eGFR 85 ml/min/1.73sq m   Lipid panel   Result Value Ref Range    Cholesterol 171 See Comment mg/dL    Triglycerides 51 See Comment mg/dL    HDL, Direct 56 >=40 mg/dL    LDL Calculated 105 (H) 0 - 100 mg/dL    Non-HDL-Chol (CHOL-HDL) 115 mg/dl   HIV 1/2 AB/AG w Reflex [facility] for 2 yr old and above   Result Value Ref Range    HIV-1 p24 Antigen Non-Reactive Non-Reactive    HIV-1 Antibody Non-Reactive Non-Reactive    HIV-2 Antibody Non-Reactive Non-Reactive    HIV Ag-Ab 5th Gen Non-Reactive Non-Reactive       Orders Placed This Encounter   Procedures    Hepatitis C antibody       ALLERGIES:  No Known Allergies    Current Medications     Current Outpatient Medications   Medication Sig Dispense Refill    DULoxetine (CYMBALTA) 30 mg delayed release capsule Take 1 capsule (30 mg total) by mouth daily 30 capsule 5    sertraline (Zoloft) 50 mg tablet Take 1 tablet (50 mg total) by mouth daily 30 tablet 5    triamcinolone (KENALOG) 0.5 % cream Apply topically 2 (two) times a day 15 g 2     No current facility-administered medications for this visit.          Health Maintenance     Health Maintenance   Topic Date Due    Hepatitis C Screening  Never done    COVID-19 Vaccine (1) Never done    HPV Vaccine (1 - Male 2-dose series) Never done    PT PLAN OF CARE  10/11/2023    BMI: Adult  09/25/2024    Annual Physical  09/25/2024    Depression Screening  10/17/2024    DTaP,Tdap,and Td Vaccines (7 - Td or Tdap) 11/18/2032    HIV Screening  Completed    Pneumococcal Vaccine: Pediatrics (0 to 5 Years) and At-Risk Patients (6 to 59 Years)  Completed    HIB Vaccine  Completed    IPV Vaccine  Completed    Hepatitis A Vaccine  Completed    Meningococcal ACWY Vaccine  Completed    Influenza Vaccine  Completed     Immunization History   Administered Date(s) Administered    DTaP,unspecified 2000, 01/16/2001, 04/10/2001, 10/17/2005    Hep A, ped/adol, 2 dose 02/03/2010, 02/11/2011    Hep B, Adolescent or Pediatric 04/10/2001, 07/26/2001, 01/25/2002    HiB 2000, 01/16/2001, 04/10/2001, 10/12/2001    INFLUENZA 11/16/2017, 11/10/2021, 09/25/2023    IPV 2000, 01/16/2001, 01/25/2002, 10/17/2005    Influenza, injectable, quadrivalent, preservative free 0.5 mL 11/10/2021, 11/18/2022, 09/25/2023    MMR 10/12/2001, 10/13/2004    Meningococcal ACWY, unspecified 02/16/2012, 11/17/2017    Meningococcal B, Recombinant (Mony Calix) 11/16/2017, 08/14/2018    Pneumococcal Conjugate 13-Valent 2000, 01/16/2001, 04/10/2001, 01/25/2002    Tdap 02/16/2012, 11/18/2022    Varicella 10/12/2001, 19/21/1318       Ev Frazier MD

## 2023-10-17 NOTE — ASSESSMENT & PLAN NOTE
Anxiety. Patient previously had been on Lexapro up to 20 mg but felt it was ineffective at treating anxiety well. We will discontinue Cymbalta and switch to Zoloft 50 mg for patient to try once daily. Patient will report if he develops any new side effects.   He will call if he does not see improvement in anxiety over the first 2 to 4 weeks

## 2023-10-18 ENCOUNTER — APPOINTMENT (OUTPATIENT)
Dept: LAB | Facility: CLINIC | Age: 23
End: 2023-10-18
Payer: COMMERCIAL

## 2023-10-18 ENCOUNTER — OFFICE VISIT (OUTPATIENT)
Dept: PHYSICAL THERAPY | Facility: REHABILITATION | Age: 23
End: 2023-10-18
Payer: COMMERCIAL

## 2023-10-18 DIAGNOSIS — Z20.2 EXPOSURE TO STD: ICD-10-CM

## 2023-10-18 DIAGNOSIS — S43.431D SUPERIOR GLENOID LABRUM LESION OF RIGHT SHOULDER, SUBSEQUENT ENCOUNTER: Primary | ICD-10-CM

## 2023-10-18 LAB — HCV AB SER QL: NORMAL

## 2023-10-18 PROCEDURE — 86803 HEPATITIS C AB TEST: CPT

## 2023-10-18 PROCEDURE — 97110 THERAPEUTIC EXERCISES: CPT | Performed by: PHYSICAL THERAPIST

## 2023-10-18 PROCEDURE — 97112 NEUROMUSCULAR REEDUCATION: CPT | Performed by: PHYSICAL THERAPIST

## 2023-10-18 PROCEDURE — 36415 COLL VENOUS BLD VENIPUNCTURE: CPT

## 2023-10-18 NOTE — PROGRESS NOTES
Daily Note     Today's date: 10/18/2023  Patient name: Yanni Godoy  : 2000  MRN: [id number]  Referring provider: Brodie Aggarwal  Dx:   Encounter Diagnosis     ICD-10-CM    1. Superior glenoid labrum lesion of right shoulder, subsequent encounter  S43.431D           Start Time: 1330  Stop Time: 1415  Total time in clinic (min): 45 minutes    Subjective: Patient reports doing well, reports no pain at this time. Objective: See treatment diary below  ER = 90 deg    Assessment: Tolerated treatment well. Cues for scap control with RC and scapular exercises. Updated HEP to incorporate Ts. Plan: Continue per plan of care.       Precautions: Surgery 23 - see protocol   HEP: ER 90-90 AROM, Row/A's/ER/IR w/ bands, wall slides    Date 10/18 9/13 9/18 9/20 9/25 9/27 10/2 10/4 10/9 10/16   Manuals             STM Pec - AB   Pec AB Pec WM Pec WM Pec, delt, bicep tendon WM Pec, bicep tendon WM                                            Neuro Re-Ed             AROM         ER 1x10 HEP                                                                                  Ther Ex 10/18 9/13           PROM AB AB WM WM  WM WM WM WM WM WM   pendulum  3' 4' all directions  HEP          Ts XS 3# - 3x5            Scap ret  5"x10 2x10 5" hold  HEP          PROM w/ dowel    ER, flex, ABD 2x10 each, added to HEP ER, flex, ABD 2x10        Scap Iso     3lb band  Rows 1x10  A's 2x10 Rows 10 lb 3x10 A's 7 lbs 3x10 Rows 10 lb 2x10 2" hold  A's 7 lbs 2x10 2" hold Row 15 lb 2x10 2"   A's 7 lbs 2x10 2" hold       IR/ER [place] 3#  3x8 ER  5# IR 3x8      Gray band 2x10 2" hold [place] IR 6 lbs 2x10 2" hold  [place] ER 5 lbs  2x10 2" hold ER 3 lb [place] 3x10  IR 7 lb 3x10 ER 3 lb [place] 2x10  IR 10 lb 3x10  ER walkout with eccentric 1x10    Rows         20 lb [place] 3x10 20 lb [place] 3x10   A's XS 3#  10x        12 lb [id number]  15 lb [place] 3x10   Shoulder taps In bear hold - 30"x2         Bear hold 2x30"   Wall slides On red cord  2x10      1x10 HEP    On red cord 2x10   Ther Activity             Bike NP  6' lvl 5 6' lvl 5 6' lvl 6 6' 6' lvl 6 6' lvl 7 6' lvl 8 6' lvl 8                Gait Training                                       Modalities

## 2023-10-23 ENCOUNTER — EVALUATION (OUTPATIENT)
Dept: PHYSICAL THERAPY | Facility: REHABILITATION | Age: 23
End: 2023-10-23
Payer: COMMERCIAL

## 2023-10-23 ENCOUNTER — OFFICE VISIT (OUTPATIENT)
Dept: OBGYN CLINIC | Facility: OTHER | Age: 23
End: 2023-10-23

## 2023-10-23 VITALS
HEIGHT: 77 IN | SYSTOLIC BLOOD PRESSURE: 125 MMHG | HEART RATE: 65 BPM | BODY MASS INDEX: 23.32 KG/M2 | WEIGHT: 197.53 LBS | DIASTOLIC BLOOD PRESSURE: 80 MMHG

## 2023-10-23 DIAGNOSIS — S43.431D SUPERIOR GLENOID LABRUM LESION OF RIGHT SHOULDER, SUBSEQUENT ENCOUNTER: Primary | ICD-10-CM

## 2023-10-23 DIAGNOSIS — Z47.89 AFTERCARE FOLLOWING SURGERY OF THE MUSCULOSKELETAL SYSTEM: Primary | ICD-10-CM

## 2023-10-23 PROCEDURE — 97110 THERAPEUTIC EXERCISES: CPT | Performed by: PHYSICAL THERAPIST

## 2023-10-23 PROCEDURE — 99024 POSTOP FOLLOW-UP VISIT: CPT | Performed by: PHYSICIAN ASSISTANT

## 2023-10-23 NOTE — PROGRESS NOTES
PT Re-evaluation    Today's date: 10/23/2023  Patient name: Elisha Cohen  : 2000  MRN: [id number]  Referring provider: Mirella Esparza  Dx:   Encounter Diagnosis     ICD-10-CM    1. Superior glenoid labrum lesion of right shoulder, subsequent encounter  S43.431D           Start Time: 1015  Stop Time: 1100  Total time in clinic (min): 45 minutes    Assessment  Assessment details: Patient is a 21y.o. year old male who attended physical therapy for 11 treatment sessions regarding right labral repair. Patient reports  improvement at this time which correlates to improved impairments and functionality. Patient has shown improvement throughout PT by demonstrating decreased pain, increased range of motion, increased strength, and improved tolerance to activity. Patient continues to present with pain, decreased ROM, decreased strength, and decreased tolerance to activity. Gauri Pineda would benefit from continued physical therapy to address these issues and to maximize function. Thank you. Impairments: abnormal muscle firing, abnormal muscle tone, abnormal or restricted ROM, abnormal movement, activity intolerance, impaired physical strength and pain with function  Understanding of Dx/Px/POC: good   Prognosis: good    Plan  Patient would benefit from: skilled PT  Planned modality interventions: thermotherapy: hydrocollator packs  Planned therapy interventions: joint mobilization, manual therapy, ADL training, neuromuscular re-education, home exercise program, therapeutic exercise, therapeutic activities, strengthening, patient education and functional ROM exercises  Frequency: 2x week  Duration in weeks: 12  Treatment plan discussed with: patient        Subjective Evaluation    History of Present Illness  Mechanism of injury:   Evaluation:  Gauri Pineda is a 25 y.o. male presenting to physical therapy on 23 with referral from MD after right posterior labral repair and SLAP repair on 23.  Patient presents today with pain controlled, wearing sling. Patient Goals  Patient goals for therapy: decreased pain and increased strength  Patient goal: return to baseball throwing, hitting    Diagnostic Tests  MRI studies: abnormal  Treatments  Previous treatment: physical therapy    Short Term Goals:   1. Patient will be Independent with hep  2. Patient will improve pain with activity by 50%  3. Patient will report GROC 50% or greater  4. Patient will have PROM per protocol at 6 wk  5. Patient will have AROM per protocol at 6 wk      Long Term Goals:   1. Patient will improve FOTO to greater then goal - improved  2. Patient will improve pain with activity to 2/10 or less - improved  3. Patient will continue with HEP independence to allow for decreased future reoccurrence of pain and loss in function - improved  4. Patient will report GROC 75% or greater - NOT MET  5. Patient will have 4+/5 all planes shoulder - NOT MET  6. Patient will throw a baseball pain free - NOT MET  7. Patient will make 10 throws at 75% from 60 ft pain free - NOT MET  8. Patient will resume upper extremity lifting at gym pain free - NOT MET        Objective     Posture: depressed scap b/l      Palpation: TTP anterior pec           MMT         AROM          PROM    Shoulder       L       R        L           R      L     R   Flex.   3+  WFL  160   Abd.      150   IR.  4  T5     ER.  3+  [place] scap  93            Rhomboid         Mid Trap         Low Trap         Serratus         Infraspnatus         Teres Major         Sub Scap                         Precautions: Surgery 9/6/23 - see protocol     Date 10/18 10/23 9/18 9/20 9/25 9/27 10/2 10/4 10/9 10/16   Manuals                       STM Pec - AB     Pec AB Pec WM Pec WM Pec, delt, bicep tendon WM Pec, bicep tendon WM                                                                               Neuro Re-Ed                       AROM                 ER 1x10 HEP Ther Ex 10/18 10/23                   PROM AB AB WM WM  WM WM WM WM WM WM   pendulum    4' all directions  HEP                 Ts, Ys, Is  2x10 ea           UBE  2'/2'           PROM w/ dowel      ER, flex, ABD 2x10 each, added to HEP ER, flex, ABD 2x10             Scap Iso        3lb band  Rows 1x10  A's 2x10 Rows 10 lb 3x10 A's 7 lbs 3x10 Rows 10 lb 2x10 2" hold  A's 7 lbs 2x10 2" hold Row 15 lb 2x10 2"   A's 7 lbs 2x10 2" hold          IR/ER [place] 3#  3x8 ER  5# IR 3x8 [place] 3#   ER 2x10  6#  2x10         Gray band 2x10 2" hold [place] IR 6 lbs 2x10 2" hold  Toya ER 5 lbs  2x10 2" hold ER 3 lb [place] 3x10  IR 7 lb 3x10 ER 3 lb [place] 2x10  IR 10 lb 3x10  ER walkout with eccentric 1x10    Rows                20 lb toya 3x10 20 lb toya 3x10   A's XS 3#  10x XS  2x10  3#             12 lb [id number]  15 lb [place] 3x10   Shoulder taps In bear hold - 30"x2 30"x3               Bear hold 2x30"   Wall slides On red cord  2x10 On redcord - 2x10         1x10 HEP      On red cord 2x10   Ther Activity                      Bike NP  6' lvl 5 6' lvl 5 6' lvl 6 6' 6' lvl 6 6' lvl 7 6' lvl 8 6' lvl 8                          Gait Training                                                                      Modalities

## 2023-10-23 NOTE — PROGRESS NOTES
Assessment:       1. Aftercare following surgery of the musculoskeletal system          Plan:        Patient is doing well postoperatively and is making good progress in PT. He denies any feelings of instability as PT ROM advances. All questions were addressed/answered to the patient's satisfaction. Patient continue PT following posterior labrum repair rehab protocol. Follow-up is in 2 months to assess patient's progress. Subjective:     Patient ID: Jose Meek is a 21 y.o. male. Chief Complaint:    HPI  Patient presents for follow-up status post right shoulder arthroscopy with SLAP repair and posterior labral repair on 9/16/2023 (~7 weeks). He reports he feels good and has no sense of feeling instability since his surgery. Social History     Occupational History    Not on file   Tobacco Use    Smoking status: Never    Smokeless tobacco: Never   Vaping Use    Vaping Use: Never used   Substance and Sexual Activity    Alcohol use: Yes    Drug use: No    Sexual activity: Not Currently      Review of Systems   Constitutional: Negative. Respiratory: Negative. Cardiovascular: Negative. Musculoskeletal:  Positive for myalgias. Negative for arthralgias. Skin:  Negative for wound. Neurological:  Positive for weakness. Negative for numbness. Psychiatric/Behavioral: Negative. Objective:     Ortho ExamPhysical Exam  HENT:      Head: Atraumatic. Cardiovascular:      Pulses: Normal pulses. Pulmonary:      Effort: Pulmonary effort is normal.   Musculoskeletal:      Comments: Active range of motion right shoulder: Forward flexion 150 degrees, external rotation 90 degrees internal rotation to lumbar spine. Skin:     General: Skin is warm and dry. Capillary Refill: Capillary refill takes less than 2 seconds. Comments: Surgical incisions dry and clean, healed. Neurological:      Mental Status: He is alert and oriented to person, place, and time.       Sensory: No sensory deficit.    Psychiatric:         Mood and Affect: Mood normal.         Behavior: Behavior normal.

## 2023-10-25 ENCOUNTER — APPOINTMENT (OUTPATIENT)
Dept: PHYSICAL THERAPY | Facility: REHABILITATION | Age: 23
End: 2023-10-25
Payer: COMMERCIAL

## 2023-10-26 ENCOUNTER — APPOINTMENT (OUTPATIENT)
Dept: PHYSICAL THERAPY | Facility: REHABILITATION | Age: 23
End: 2023-10-26
Payer: COMMERCIAL

## 2023-10-30 ENCOUNTER — OFFICE VISIT (OUTPATIENT)
Dept: PHYSICAL THERAPY | Facility: REHABILITATION | Age: 23
End: 2023-10-30
Payer: COMMERCIAL

## 2023-10-30 DIAGNOSIS — S43.431D SUPERIOR GLENOID LABRUM LESION OF RIGHT SHOULDER, SUBSEQUENT ENCOUNTER: Primary | ICD-10-CM

## 2023-10-30 PROCEDURE — 97110 THERAPEUTIC EXERCISES: CPT | Performed by: PHYSICAL THERAPIST

## 2023-10-30 PROCEDURE — 97140 MANUAL THERAPY 1/> REGIONS: CPT | Performed by: PHYSICAL THERAPIST

## 2023-10-30 PROCEDURE — 97112 NEUROMUSCULAR REEDUCATION: CPT | Performed by: PHYSICAL THERAPIST

## 2023-10-30 NOTE — PROGRESS NOTES
Daily Note     Today's date: 10/30/2023  Patient name: Ingrid Thrasher  : 2000  MRN: [id number]  Referring provider: Hernán Guido  Dx:   Encounter Diagnosis     ICD-10-CM    1. Superior glenoid labrum lesion of right shoulder, subsequent encounter  S43.431D           Start Time: 0845  Stop Time: 09  Total time in clinic (min): 45 minutes    Subjective: Pt reports feeling good. No new updates. Objective: See treatment diary below. R shoulder  Flex 170 . Tissue tension at subscap/teres major/lats      Assessment: Tolerated treatment well. Pt continues progressing as expected. ROM initially restricted. Resolved after STM of subscap/teres major/lats. Shoulder ROM improved approx. 30 degrees for both ABD and flexion following. Added in bear crawls which the pt tolerated well. Pt tolerated all strengthening exercises well today demonstrating improved quality of movement due to increased motor control. Continue progressing pt on close chain and strengthening exercises. Patient demonstrated fatigue post treatment, exhibited good technique with therapeutic exercises, and would benefit from continued PT      Plan: Continue per plan of care.       Precautions: Surgery 23 - see protocol     Date 10/18 10/23 10/30 9/20 9/25 9/27 10/2 10/4 10/9 10/16   Manuals                       STM Pec - AB    Lats/subscap/teres amilcar WM 8' Pec AB Pec WM Pec WM Pec, delt, bicep tendon WM Pec, bicep tendon WM                                                                               Neuro Re-Ed                       AROM                 ER 1x10 HEP      Bear crawl     fwd/bwd lateral 2x10' each                                                                                                                                         Ther Ex 10/18 10/23                   PROM AB AB WM WM  WM WM WM WM WM WM   pendulum                     Ts, Ys, Is  2x10 ea T's purple 2x10  Y's green 2x10          UBE 2'/2' 2'/2'          PROM w/ dowel      ER, flex, ABD 2x10 each, added to HEP ER, flex, ABD 2x10             Scap Iso        3lb band  Rows 1x10  A's 2x10 Rows 10 lb 3x10 A's 7 lbs 3x10 Rows 10 lb 2x10 2" hold  A's 7 lbs 2x10 2" hold Row 15 lb 2x10 2"   A's 7 lbs 2x10 2" hold          IR/ER [place] 3#  3x8 ER  5# IR 3x8 [place] 3#   ER 2x10  6#  2x10  Toya 4 lb ER 2x10 8 lb 2x10       Gray band 2x10 2" hold [place] IR 6 lbs 2x10 2" hold  Toya ER 5 lbs  2x10 2" hold ER 3 lb Toya 3x10  IR 7 lb 3x10 ER 3 lb [place] 2x10  IR 10 lb 3x10  ER walkout with eccentric 1x10    Rows     30 lb 2x10            20 lb toya 3x10 20 lb toya 3x10   A's XS 3#  10x XS  2x10  3#  Yellow 2x10           12 lb [id number]  15 lb Toya 3x10   Shoulder taps In bear hold - 30"x2 30"x3               Bear hold 2x30"   Wall slides On red cord  2x10 On redcord - 2x10  On red cord 1x10  W/ one arm lift off 2x10 3" hold       1x10 HEP      On red cord 2x10   Ther Activity                      Bike NP  6' lvl 5 6' lvl 5 6' lvl 6 6' 6' lvl 6 6' lvl 7 6' lvl 8 6' lvl 8                          Gait Training                                                                      Modalities

## 2023-11-01 ENCOUNTER — OFFICE VISIT (OUTPATIENT)
Dept: PHYSICAL THERAPY | Facility: REHABILITATION | Age: 23
End: 2023-11-01
Payer: COMMERCIAL

## 2023-11-01 DIAGNOSIS — S43.431D SUPERIOR GLENOID LABRUM LESION OF RIGHT SHOULDER, SUBSEQUENT ENCOUNTER: Primary | ICD-10-CM

## 2023-11-01 PROCEDURE — 97140 MANUAL THERAPY 1/> REGIONS: CPT | Performed by: PHYSICAL THERAPIST

## 2023-11-01 PROCEDURE — 97110 THERAPEUTIC EXERCISES: CPT | Performed by: PHYSICAL THERAPIST

## 2023-11-01 PROCEDURE — 97112 NEUROMUSCULAR REEDUCATION: CPT | Performed by: PHYSICAL THERAPIST

## 2023-11-01 NOTE — PROGRESS NOTES
Daily Note     Today's date: 2023  Patient name: Gabbi Landin  : 2000  MRN: [id number]  Referring provider: Ryland Neil  Dx:   Encounter Diagnosis     ICD-10-CM    1. Superior glenoid labrum lesion of right shoulder, subsequent encounter  S43.431D           Start Time: 745  Stop Time: 08  Total time in clinic (min): 45 minutes    Subjective: Pt reports feeling well. No new updates. Objective: See treatment diary below. R shoulder  F 180       Assessment: Tolerated treatment well. Pt continues to progress as expected. Pt achieved full ROM following STM to relieve tissue tension. Pt continues to demonstrate improvements in strength and motor control. Added in push ups on bands and arm circles on bands which the pt tolerated well. Stability exercises continue to challenge the pt. Continue progressing the pt as tolerated. Patient demonstrated fatigue post treatment, exhibited good technique with therapeutic exercises, and would benefit from continued PT      Plan: Continue per plan of care.       Precautions: Surgery 23 - see protocol     Date 10/18 10/23 10/30 11/1 9/25 9/27 10/2 10/4 10/9 10/16   Manuals                       STM Pec - AB    Lats/subscap/teres amilcar WM 8' Lats/subscap/teres major 8' Pec WM Pec WM Pec, delt, bicep tendon WM Pec, bicep tendon WM                                                                               Neuro Re-Ed                       AROM                 ER 1x10 HEP      Bear crawl     fwd/bwd lateral 2x10' each  fwd/bwd lateral 2x20' ea               Bodyblade        OH fwd and lat  2x20" ea                Single arm band perturbations        Green 3x20'                Push up        On green jump band 3x5                                                               Ther Ex 10/18 10/23                   PROM AB AB WM WM  WM WM WM WM WM WM   pendulum                     Ts, Ys, Is  2x10 ea T's purple 2x10  Y's green 2x10 T's purple 2x10 3"  Y's green 2x10 3"         UBE  2'/2' 2'/2' 3'/3'         PROM w/ dowel       ER, flex, ABD 2x10             Scap Iso        3lb band  Rows 1x10  A's 2x10 Rows 10 lb 3x10 A's 7 lbs 3x10 Rows 10 lb 2x10 2" hold  A's 7 lbs 2x10 2" hold Row 15 lb 2x10 2"   A's 7 lbs 2x10 2" hold          IR/ER [place] 3#  3x8 ER  5# IR 3x8 Toya 3#   ER 2x10  6#  2x10 [place] 4 lb ER 2x10 8 lb 2x10 Toya 6 lb ER   1x8  5 lb 1x10  12 lb IR 2x10     Gray band 2x10 2" hold [place] IR 6 lbs 2x10 2" hold  [place] ER 5 lbs  2x10 2" hold ER 3 lb Toya 3x10  IR 7 lb 3x10 ER 3 lb [place] 2x10  IR 10 lb 3x10  ER walkout with eccentric 1x10    Rows     30 lb 2x10            20 lb toya 3x10 20 lb toya 3x10   A's XS 3#  10x XS  2x10  3#  Yellow 2x10           12 lb [id number]  15 lb Toya 3x10   Shoulder taps In bear hold - 30"x2 30"x3               Bear hold 2x30"   Wall slides On red cord  2x10 On redcord - 2x10  On red cord 1x10  W/ one arm lift off 2x10 3" hold       1x10 HEP      On red cord 2x10   Ther Activity                      Bike NP  6' lvl 5  6' lvl 6 6' 6' lvl 6 6' lvl 7 6' lvl 8 6' lvl 8                          Gait Training                                                                      Modalities

## 2023-11-06 ENCOUNTER — OFFICE VISIT (OUTPATIENT)
Dept: PHYSICAL THERAPY | Facility: REHABILITATION | Age: 23
End: 2023-11-06
Payer: COMMERCIAL

## 2023-11-06 DIAGNOSIS — S43.431D SUPERIOR GLENOID LABRUM LESION OF RIGHT SHOULDER, SUBSEQUENT ENCOUNTER: Primary | ICD-10-CM

## 2023-11-06 PROCEDURE — 97112 NEUROMUSCULAR REEDUCATION: CPT | Performed by: PHYSICAL THERAPIST

## 2023-11-06 PROCEDURE — 97140 MANUAL THERAPY 1/> REGIONS: CPT | Performed by: PHYSICAL THERAPIST

## 2023-11-06 PROCEDURE — 97110 THERAPEUTIC EXERCISES: CPT | Performed by: PHYSICAL THERAPIST

## 2023-11-06 NOTE — PROGRESS NOTES
Daily Note     Today's date: 2023  Patient name: David Altamirano  : 2000  MRN: [id number]  Referring provider: Yamilka Noyola  Dx:   Encounter Diagnosis     ICD-10-CM    1. Superior glenoid labrum lesion of right shoulder, subsequent encounter  S43.431D           Start Time: 0830  Stop Time: 0920  Total time in clinic (min): 50 minutes    Subjective: Pt reports feeling well. No issues following last session. No new updates. Objective: See treatment diary below. Assessment: Tolerated treatment well. PROM progressing as expected. Increased resistance for periscapular strengthening exercises. Pt was fatigued but w/ no pain. Added in push ups. Pt complained of weakness on his left side when pushing from the bottom position, demonstrating issues w/ motor control and strength. Pt continues to have weakness and decreased endurance for rotator cuff musculature when performing isotonic ER/IR strengthening. Will continue progressing pt as he tolerates. Continue w/ close chained, stability, and ER/IR strengthening. Patient demonstrated fatigue post treatment, exhibited good technique with therapeutic exercises, and would benefit from continued PT      Plan: Continue per plan of care.       Precautions: Surgery 23 - see protocol     Date 10/18 10/23 10/30 11/1 11/6 9/27 10/2 10/4 10/9 10/16   Manuals                       STM Pec - AB    Lats/subscap/teres amilcar WM 8' Lats/subscap/teres major 8' Lats/subscap/teres major WM 8' Pec WM Pec, delt, bicep tendon WM Pec, bicep tendon WM                                                                               Neuro Re-Ed                       AROM                 ER 1x10 HEP      Bear crawl     fwd/bwd lateral 2x10' each  fwd/bwd lateral 2x20' ea               Bodyblade       OH fwd and lat  2x20" ea               Single arm band perturbations        Green 3x20'  Green 3x30'              Push up        On green jump band 3x5  On floor  3x3    On GJB  Peg on 14 3x8                                                             Ther Ex 10/18 10/23                   PROM AB AB WM WM  WM WM WM WM WM WM   pendulum                     Ts, Ys, Is  2x10 ea T's purple 2x10  Y's green 2x10 T's purple 2x10 3"  Y's green 2x10 3" T's yellow 2x10    W into Y raise Green 2x12 3"         UBE  2'/2' 2'/2' 3'/3' 3'/3'        PROM w/ dowel                    Scap Iso         Rows 10 lb 3x10 A's 7 lbs 3x10 Rows 10 lb 2x10 2" hold  A's 7 lbs 2x10 2" hold Row 15 lb 2x10 2"   A's 7 lbs 2x10 2" hold          IR/ER [place] 3#  3x8 ER  5# IR 3x8 [place] 3#   ER 2x10  6#  2x10 [place] 4 lb ER 2x10 8 lb 2x10 [place] 6 lb ER   1x8  5 lb 1x10  12 lb IR 2x10 [place] 7 lb ER   2x8  12 lb IR 2x10   Gray band 2x10 2" hold [place] IR 6 lbs 2x10 2" hold  Toya ER 5 lbs  2x10 2" hold ER 3 lb [place] 3x10  IR 7 lb 3x10 ER 3 lb [place] 2x10  IR 10 lb 3x10  ER walkout with eccentric 1x10    Rows     30 lb 2x10            20 lb toya 3x10 20 lb tyoa 3x10   A's XS 3#  10x XS  2x10  3#  Yellow 2x10           12 lb [id number]  15 lb [place] 3x10   Shoulder taps In bear hold - 30"x2 30"x3               Bear hold 2x30"   Wall slides On red cord  2x10 On redcord - 2x10  On red cord 1x10  W/ one arm lift off 2x10 3" hold       1x10 HEP      On red cord 2x10   Ther Activity                      Bike NP  6' lvl 5   6' 6' lvl 6 6' lvl 7 6' lvl 8 6' lvl 8                          Gait Training                                                                      Modalities

## 2023-11-08 ENCOUNTER — OFFICE VISIT (OUTPATIENT)
Dept: PHYSICAL THERAPY | Facility: REHABILITATION | Age: 23
End: 2023-11-08
Payer: COMMERCIAL

## 2023-11-08 DIAGNOSIS — S43.431D SUPERIOR GLENOID LABRUM LESION OF RIGHT SHOULDER, SUBSEQUENT ENCOUNTER: Primary | ICD-10-CM

## 2023-11-08 PROCEDURE — 97112 NEUROMUSCULAR REEDUCATION: CPT | Performed by: PHYSICAL THERAPIST

## 2023-11-08 PROCEDURE — 97110 THERAPEUTIC EXERCISES: CPT | Performed by: PHYSICAL THERAPIST

## 2023-11-08 NOTE — PROGRESS NOTES
Daily Note     Today's date: 2023  Patient name: Alexus Ibarra  : 2000  MRN: [id number]  Referring provider: Zulma Young  Dx:   Encounter Diagnosis     ICD-10-CM    1. Superior glenoid labrum lesion of right shoulder, subsequent encounter  S43.431D           Start Time: 0900  Stop Time: 0945  Total time in clinic (min): 45 minutes    Subjective: Pt reports feeling well. No new updates. Objective: See treatment diary below      Assessment: Tolerated treatment well. PROM progressing as expected. Continues to have some fatigue and weakness w/ push up. Elevated pt onto plinth today and he still demonstrates weakness, fatigue, and motor control issues. Pt continues to have weakness and decreased endurance for rotator cuff musculature when performing isotonic ER/IR strengthening. Will add in eccentrics [place]. Will continue progressing pt as he tolerates. Continue w/ close chained, stability, [place] dynamic stability, and ER/IR strengthening. Patient demonstrated fatigue post treatment, exhibited good technique with therapeutic exercises, and would benefit from continued PT      Plan: Continue per plan of care.       Precautions: Surgery 23 - see protocol     Date 10/18 10/23 10/30 11/1 11/6 11/8 10/2 10/4 10/9 10/16   Manuals                       STM Pec - AB    Lats/subscap/teres amilcar WM 8' Lats/subscap/teres major 8' Lats/subscap/teres major WM 8'  Pec, delt, bicep tendon WM Pec, bicep tendon WM                                                                               Neuro Re-Ed                       AROM                 ER 1x10 HEP      Bear crawl     fwd/bwd lateral 2x10' each  fwd/bwd lateral 2x20' ea               Bodyblade       OH fwd and lat  2x20" ea               Single arm band perturbations        Green 3x20'  Green 3x30'              Push up        On green jump band 3x5  On floor  3x3    On GJB  Peg on 14 3x8  On plinth 3x5            D2 ext w/ ball toss            2x8 Ther Ex 10/18 10/23                   PROM AB AB WM WM  WM WM WM WM WM WM   pendulum                     Ts, Ys, Is  2x10 ea T's purple 2x10  Y's green 2x10 T's purple 2x10 3"  Y's green 2x10 3" T's yellow 2x10    W into Y raise Green 2x12 3"  W into Y raise green 2x15 3"       UBE  2'/2' 2'/2' 3'/3' 3'/3' 3'/3' lvl 5       PROM w/ dowel                    Scap Iso          Rows 10 lb 2x10 2" hold  A's 7 lbs 2x10 2" hold Row 15 lb 2x10 2"   A's 7 lbs 2x10 2" hold          IR/ER [place] 3#  3x8 ER  5# IR 3x8 [place] 3#   ER 2x10  6#  2x10 [place] 4 lb ER 2x10 8 lb 2x10 [place] 6 lb ER   1x8  5 lb 1x10  12 lb IR 2x10 [place] 7 lb ER   2x8  12 lb IR 2x10  [place] 5 lb ER 2x15  IR 12 lb 2x12 Gray band 2x10 2" hold Toya IR 6 lbs 2x10 2" hold  Toya ER 5 lbs  2x10 2" hold ER 3 lb [place] 3x10  IR 7 lb 3x10 ER 3 lb Toya 2x10  IR 10 lb 3x10  ER walkout with eccentric 1x10    Rows     30 lb 2x10            20 lb toya 3x10 20 lb toya 3x10   A's XS 3#  10x XS  2x10  3#  Yellow 2x10           12 lb [id number]  15 lb Toya 3x10   Shoulder taps In bear hold - 30"x2 30"x3               Bear hold 2x30"   Wall slides On red cord  2x10 On redcord - 2x10  On red cord 1x10  W/ one arm lift off 2x10 3" hold       1x10 HEP      On red cord 2x10   Ther Activity                      Bike NP  6' lvl 5    6' lvl 6 6' lvl 7 6' lvl 8 6' lvl 8                          Gait Training                                                                      Modalities

## 2023-11-13 ENCOUNTER — OFFICE VISIT (OUTPATIENT)
Dept: PHYSICAL THERAPY | Facility: REHABILITATION | Age: 23
End: 2023-11-13
Payer: COMMERCIAL

## 2023-11-13 DIAGNOSIS — S43.431D SUPERIOR GLENOID LABRUM LESION OF RIGHT SHOULDER, SUBSEQUENT ENCOUNTER: Primary | ICD-10-CM

## 2023-11-13 PROCEDURE — 97110 THERAPEUTIC EXERCISES: CPT | Performed by: PHYSICAL THERAPIST

## 2023-11-13 PROCEDURE — 97112 NEUROMUSCULAR REEDUCATION: CPT | Performed by: PHYSICAL THERAPIST

## 2023-11-13 NOTE — PROGRESS NOTES
Daily Note     Today's date: 2023  Patient name: Gretchen Palmer  : 2000  MRN: [id number]  Referring provider: Ector Dunn  Dx:   Encounter Diagnosis     ICD-10-CM    1. Superior glenoid labrum lesion of right shoulder, subsequent encounter  S43.431D           Start Time:   Stop Time: 5730  Total time in clinic (min): 64 minutes    Subjective: Patient reports feeling well. States that his shoulder has felt good with all activity. Objective: See treatment diary below      Assessment: Tolerated treatment well. Challenged with ER at side as he fatigues by reps 5-6. Worked in [employer] which improved quality of his ER. He also continues to be challenged with pressing movements engaging anterior chain. Plan: Continue per plan of care.       Precautions: Surgery 23 - see protocol     Date 10/18 10/23 10/30 11/1 11/6 11/8 11/13 10/4 10/9 10/16   Manuals                       STM Pec - AB    Lats/subscap/teres amilcar WM 8' Lats/subscap/teres major 8' Lats/subscap/teres major WM 8'  Pec Pec, bicep tendon WM                                                                               Neuro Re-Ed                       AROM                 ER 1x10 HEP      Bear crawl     fwd/bwd lateral 2x10' each  fwd/bwd lateral 2x20' ea               Bodyblade       OH fwd and lat  2x20" ea               Single arm band perturbations        Green 3x20'  Green 3x30'              Push up        On green jump band 3x5  On floor  3x3    On GJB  Peg on 14 3x8  On plinth 3x5  redcord - 2x10, [place] 2x10          D2 ext w/ ball toss            2x8  2x8          Body blade             20"x2  [place] and side         Ther Ex 10/18 10/23                   PROM AB AB WM WM  WM WM AB WM WM WM   pendulum                     Ts, Ys, Is  2x10 ea T's purple 2x10  Y's green 2x10 T's purple 2x10 3"  Y's green 2x10 3" T's yellow 2x10    W into Y raise Green 2x12 3"  W into Y raise green 2x15 3" W to Y - green XS 3#    T XS 7#  3x5-7 reps      UBE  2'/2' 2'/2' 3'/3' 3'/3' 3'/3' lvl 5 3'/3' lvl 2      Punches            [place]: 15#  2x10         Scap Iso          Rows 10 lb 2x10 2" hold  A's 7 lbs 2x10 2" hold Row 15 lb 2x10 2"   A's 7 lbs 2x10 2" hold          IR/ER [place] 3#  3x8 ER  5# IR 3x8 [place] 3#   ER 2x10  6#  2x10 [place] 4 lb ER 2x10 8 lb 2x10 [place] 6 lb ER   1x8  5 lb 1x10  12 lb IR 2x10 [place] 7 lb ER   2x8  12 lb IR 2x10  Toya 5 lb ER 2x15  IR 12 lb 2x12 [place] 9#  2x8    [place] [place] [employer] [place] 6# 3x6 [place] IR 6 lbs 2x10 2" hold  [place] ER 5 lbs  2x10 2" hold ER 3 lb Toya 3x10  IR 7 lb 3x10 ER 3 lb [place] 2x10  IR 10 lb 3x10  ER walkout with eccentric 1x10    Rows     30 lb 2x10            20 lb toya 3x10 20 lb toya 3x10   A's XS 3#  10x XS  2x10  3#  Yellow 2x10           12 lb [id number]  15 lb [place]a 3x10   Shoulder taps In bear hold - 30"x2 30"x3               Bear hold 2x30"   Wall slides On red cord  2x10 On redcord - 2x10  On red cord 1x10  W/ one arm lift off 2x10 3" hold            On red cord 2x10   Ther Activity                      Bike NP  6' lvl 5    6' lvl 6  6' lvl 8 6' lvl 8                          Gait Training                                                                      Modalities

## 2023-11-15 ENCOUNTER — OFFICE VISIT (OUTPATIENT)
Dept: PHYSICAL THERAPY | Facility: REHABILITATION | Age: 23
End: 2023-11-15
Payer: COMMERCIAL

## 2023-11-15 DIAGNOSIS — S43.431D SUPERIOR GLENOID LABRUM LESION OF RIGHT SHOULDER, SUBSEQUENT ENCOUNTER: Primary | ICD-10-CM

## 2023-11-15 PROCEDURE — 97110 THERAPEUTIC EXERCISES: CPT | Performed by: PHYSICAL THERAPIST

## 2023-11-15 PROCEDURE — 97140 MANUAL THERAPY 1/> REGIONS: CPT | Performed by: PHYSICAL THERAPIST

## 2023-11-15 PROCEDURE — 97112 NEUROMUSCULAR REEDUCATION: CPT | Performed by: PHYSICAL THERAPIST

## 2023-11-15 NOTE — PROGRESS NOTES
Daily Note     Today's date: 11/15/2023  Patient name: Jose Segovia  : 2000  MRN: [id number]  Referring provider: Nighat Olivo  Dx:   Encounter Diagnosis     ICD-10-CM    1. Superior glenoid labrum lesion of right shoulder, subsequent encounter  S43.431D                      Subjective: Pt reports feeling well. No soreness following the last session. No new updates. Objective: See treatment diary below      Assessment: Tolerated treatment well. Pt continues to progress as expected. Continues to improve w/ strength, endurance, and motor control. Added in light swinging w/ a bat which the pt tolerated well. He complained of some tightness at the end range. Pt continues to have strength and endurance deficits. Continue progressing pt as he can tolerate. Patient demonstrated fatigue post treatment, exhibited good technique with therapeutic exercises, and would benefit from continued PT      Plan: Continue per plan of care.       Precautions: Surgery 23 - see protocol     Date 10/18 10/23 10/30 11/1 11/6 11/8 11/13 11/15 10/9 10/16   Manuals                       STM Pec - AB    Lats/subscap/teres amilcar WM 8' Lats/subscap/teres major 8' Lats/subscap/teres major WM 8'  Pec Pec, teres major                                                                               Neuro Re-Ed                       AROM                 ER 1x10 HEP      Bear crawl     fwd/bwd lateral 2x10' each  fwd/bwd lateral 2x20' ea               Bodyblade       OH fwd and lat  2x20" ea               Single arm band perturbations        Green 3x20'  Green 3x30'      Green CW/CCW 1x30" 1x20"        Push up        On green jump band 3x5  On floor  3x3    On GJB  Peg on 14 3x8  On plinth 3x5  redcord - 2x10, Flys 2x10 [place] 2x12  Flies 2x10        D2 ext w/ ball toss            2x8  2x8  2x8        Body blade             20"x2  Frong and side         Ther Ex 10/18 10/23                   PROM AB AB WM WM  WM WM AB WM WM WM   pendulum                     Ts, Ys, Is  2x10 ea T's purple 2x10  Y's green 2x10 T's purple 2x10 3"  Y's green 2x10 3" T's yellow 2x10    W into Y raise Green 2x12 3"  W into Y raise green 2x15 3" W to Y - green XS 3#    T XS 7#  3x5-7 reps      UBE  2'/2' 2'/2' 3'/3' 3'/3' 3'/3' lvl 5 3'/3' lvl 2 3'/3' 5     Punches            [place]: 15#  2x10  Toya: 15 lb 2x12        Scap Iso          Rows 10 lb 2x10 2" hold  A's 7 lbs 2x10 2" hold           IR/ER [place] 3#  3x8 ER  5# IR 3x8 [place] 3#   ER 2x10  6#  2x10 [place] 4 lb ER 2x10 8 lb 2x10 Toya 6 lb ER   1x8  5 lb 1x10  12 lb IR 2x10 Toya 7 lb ER   2x8  12 lb IR 2x10  Toya 5 lb ER 2x15  IR 12 lb 2x12 [place] 9#  2x8    [place] Gula 6# 3x6  ER 3 lb Toya 3x10  IR 7 lb 3x10 ER 3 lb Toya 2x10  IR 10 lb 3x10  ER walkout with eccentric 1x10    Rows     30 lb 2x10            20 lb toya 3x10 20 lb toya 3x10   A's XS 3#  10x XS  2x10  3#  Yellow 2x10           12 lb [id number]  15 lb Toya 3x10   Shoulder taps In bear hold - 30"x2 30"x3               Bear hold 2x30"   Swinging        Bat 2x10     Wall slides On red cord  2x10 On redcord - 2x10  On red cord 1x10  W/ one arm lift off 2x10 3" hold            On red cord 2x10   Ther Activity                      Bike NP  6' lvl 5    6' lvl 6  6' lvl 8 6' lvl 8                          Gait Training                                                                      Modalities

## 2023-11-20 ENCOUNTER — OFFICE VISIT (OUTPATIENT)
Dept: PHYSICAL THERAPY | Facility: REHABILITATION | Age: 23
End: 2023-11-20
Payer: COMMERCIAL

## 2023-11-20 DIAGNOSIS — S43.431D SUPERIOR GLENOID LABRUM LESION OF RIGHT SHOULDER, SUBSEQUENT ENCOUNTER: Primary | ICD-10-CM

## 2023-11-20 PROCEDURE — 97110 THERAPEUTIC EXERCISES: CPT | Performed by: PHYSICAL THERAPIST

## 2023-11-20 PROCEDURE — 97112 NEUROMUSCULAR REEDUCATION: CPT | Performed by: PHYSICAL THERAPIST

## 2023-11-20 NOTE — PROGRESS NOTES
Daily Note     Today's date: 2023  Patient name: Elizabet Gordon  : 2000  MRN: [id number]  Referring provider: Jain Estrella  Dx:   Encounter Diagnosis     ICD-10-CM    1. Superior glenoid labrum lesion of right shoulder, subsequent encounter  S43.431D           Start Time: 745  Stop Time: 0830  Total time in clinic (min): 45 minutes    Subjective: Pt reports feeling well and having no issues after last session. No new updates. Objective: See treatment diary below      Assessment: Tolerated treatment well. Added in 90/90 ER and IR which the pt tolerated well. Required some VC/TC for positioning and to reduce compensatory patterns. Added in landmine pressing which the pt tolerated well. Pt tolerated overhead bottoms up KB w/ perturbations well but demonstrates increased flexion as he fatigues. Continue progressing the pt as he can tolerate. Will add in light throwing NV or next week. Patient demonstrated fatigue post treatment, exhibited good technique with therapeutic exercises, and would benefit from continued PT      Plan: Continue per plan of care.       Precautions: Surgery 23 - see protocol     Date 10/18 10/23 10/30 11/1 11/6 11/8 11/13 11/15 11/20 10/16   Manuals                       STM Pec - AB    Lats/subscap/teres amilcar WM 8' Lats/subscap/teres major 8' Lats/subscap/teres major WM 8'  Pec Pec, teres major                                                                               Neuro Re-Ed                       AROM                       Bear crawl     fwd/bwd lateral 2x10' each  fwd/bwd lateral 2x20' ea               Bodyblade       OH fwd and lat  2x20" ea               Single arm band perturbations        Green 3x20'  Green 3x30'      Green CW/CCW 1x30" 1x20"       Rhythmic Stab         2x30" 8 lb KB bottoms up     Push up        On green jump band 3x5  On floor  3x3    On GJB  Peg on 14 3x8  On plinth 3x5  redcord - 2x10, Flys 2x10 [place] 2x12  Flies 2x10 [place] 2x15  Flies 2x12      D2 ext w/ ball toss            2x8  2x8  2x8       Body blade             20"x2  Frong and side         Ther Ex 10/18 10/23                   PROM AB AB WM WM  WM WM AB WM WM WM   pendulum                     Ts, Ys, Is  2x10 ea T's purple 2x10  Y's green 2x10 T's purple 2x10 3"  Y's green 2x10 3" T's yellow 2x10    W into Y raise Green 2x12 3"  W into Y raise green 2x15 3" W to Y - green XS 3#    T XS 7#  3x5-7 reps      UBE  2'/2' 2'/2' 3'/3' 3'/3' 3'/3' lvl 5 3'/3' lvl 2 3'/3' 5 3'/3' lvl 6    Punches            Toya: 15#  2x10  [place]: 15 lb 2x12       Scap Iso          Rows 10 lb 2x10 2" hold  A's 7 lbs 2x10 2" hold           Landmine Press         55 lb 2x12    IR/ER Toya 3#  3x8 ER  5# IR 3x8 Page 3#   ER 2x10  6#  2x10 Page 4 lb ER 2x10 8 lb 2x10 Toya 6 lb ER   1x8  5 lb 1x10  12 lb IR 2x10 Toya 7 lb ER   2x8  12 lb IR 2x10  Toya 5 lb ER 2x15  IR 12 lb 2x12 Page 9#  2x8    Bienvenido Risen 6# 3x6  90/90 GTB single ER 2x10 3"  IR GTB double 2x10 3" ER 3 lb Toya 2x10  IR 10 lb 3x10  ER walkout with eccentric 1x10    Rows     30 lb 2x10             20 lb toya 3x10   A's XS 3#  10x XS  2x10  3#  Yellow 2x10            15 lb Page 3x10   Shoulder taps In bear hold - 30"x2 30"x3               Bear hold 2x30"   Swinging        Bat 2x10     Wall slides On red cord  2x10 On redcord - 2x10  On red cord 1x10  W/ one arm lift off 2x10 3" hold            On red cord 2x10   Ther Activity                      Bike NP  6' lvl 5    6' lvl 6   6' lvl 8                          Gait Training                                                                      Modalities

## 2023-11-22 ENCOUNTER — OFFICE VISIT (OUTPATIENT)
Dept: PHYSICAL THERAPY | Facility: REHABILITATION | Age: 23
End: 2023-11-22
Payer: COMMERCIAL

## 2023-11-22 DIAGNOSIS — S43.431D SUPERIOR GLENOID LABRUM LESION OF RIGHT SHOULDER, SUBSEQUENT ENCOUNTER: Primary | ICD-10-CM

## 2023-11-22 PROCEDURE — 97110 THERAPEUTIC EXERCISES: CPT | Performed by: PHYSICAL THERAPIST

## 2023-11-22 PROCEDURE — 97140 MANUAL THERAPY 1/> REGIONS: CPT | Performed by: PHYSICAL THERAPIST

## 2023-11-22 PROCEDURE — 97112 NEUROMUSCULAR REEDUCATION: CPT | Performed by: PHYSICAL THERAPIST

## 2023-11-22 NOTE — PROGRESS NOTES
Daily Note     Today's date: 2023  Patient name: Nuno Ang  : 2000  MRN: [id number]  Referring provider: Cordelia Lugo  Dx:   Encounter Diagnosis     ICD-10-CM    1. Superior glenoid labrum lesion of right shoulder, subsequent encounter  S43.431D                      Subjective: Pt reports feeling well. No new updates. Objective: See treatment diary below      Assessment: Tolerated treatment well. Pt continues to progress as expected. He is demonstrating some compensatory patterns at end range of flexion, ABD, and ER with rhythmic stabilization due to motor control issues. Continue progressing as he can tolerate. Patient demonstrated fatigue post treatment, exhibited good technique with therapeutic exercises, and would benefit from continued PT      Plan: Continue per plan of care.       Precautions: Surgery 23 - see protocol     Date 10/18 10/23 10/30 11/1 11/6 11/8 11/13 11/15 11/20 11/22   Manuals                       STM Pec - AB    Lats/subscap/teres amilcar WM 8' Lats/subscap/teres major 8' Lats/subscap/teres major WM 8'  Pec Pec, teres major    Pec, teres major, lats, sub scap 10'                                                                           Neuro Re-Ed                       AROM                       Bear crawl     fwd/bwd lateral 2x10' each  fwd/bwd lateral 2x20' ea               Bodyblade       OH fwd and lat  2x20" ea               Single arm band perturbations        Green 3x20'  Green 3x30'      Green CW/CCW 1x30" 1x20"       Rhythmic Stab         2x30" 8 lb KB bottoms up     Push up        On green jump band 3x5  On floor  3x3    On GJB  Peg on 14 3x8  On plinth 3x5  redcord - 2x10, Flys 2x10 [place] 2x12  Flies 2x10 [place] 2x15  Flies 2x12    Ball toss          Tennis ball 3x15    D2 ext w/ ball toss            2x8  2x8  2x8  2x10 2nd smallest ball    Body blade             20"x2  Frong and side     2x20 D2 and OH fwd/bwd med/lat   Ther Ex 10/18 10/23 PROM AB AB WM WM  WM WM AB WM WM WM   pendulum                     Ts, Ys, Is  2x10 ea T's purple 2x10  Y's green 2x10 T's purple 2x10 3"  Y's green 2x10 3" T's yellow 2x10    W into Y raise Green 2x12 3"  W into Y raise green 2x15 3" W to Y - green XS 3#    T XS 7#  3x5-7 reps      UBE  2'/2' 2'/2' 3'/3' 3'/3' 3'/3' lvl 5 3'/3' lvl 2 3'/3' 5 3'/3' lvl 6    Punches            Toya: 15#  2x10  Toya: 15 lb 2x12       Scap Iso          Rows 10 lb 2x10 2" hold  A's 7 lbs 2x10 2" hold           Landmine Press         55 lb 2x12    IR/ER [place] 3#  3x8 ER  5# IR 3x8 [place] 3#   ER 2x10  6#  2x10 Toya 4 lb ER 2x10 8 lb 2x10 Toya 6 lb ER   1x8  5 lb 1x10  12 lb IR 2x10 [place] 7 lb ER   2x8  12 lb IR 2x10  Toya 5 lb ER 2x15  IR 12 lb 2x12 [place] 9#  2x8    Kathrene Curb 6# 3x6  90/90 GTB single ER 2x10 3"  IR GTB double 2x10 3" 90/90 OTB single ER 3x12     IR GTB double 2x12   Rows     30 lb 2x10                A's XS 3#  10x XS  2x10  3#  Yellow 2x10               Shoulder taps In bear hold - 30"x2 30"x3                  Swinging        Bat 2x10     Wall slides On red cord  2x10 On redcord - 2x10  On red cord 1x10  W/ one arm lift off 2x10 3" hold              Ther Activity                    Bike NP  6' lvl 5    6' lvl 6                             Gait Training                                                                      Modalities

## 2023-11-24 PROBLEM — Z00.00 PERIODIC HEALTH ASSESSMENT, GENERAL SCREENING, ADULT: Status: RESOLVED | Noted: 2023-09-25 | Resolved: 2023-11-24

## 2023-11-27 ENCOUNTER — OFFICE VISIT (OUTPATIENT)
Dept: PHYSICAL THERAPY | Facility: REHABILITATION | Age: 23
End: 2023-11-27
Payer: COMMERCIAL

## 2023-11-27 DIAGNOSIS — S43.431D SUPERIOR GLENOID LABRUM LESION OF RIGHT SHOULDER, SUBSEQUENT ENCOUNTER: Primary | ICD-10-CM

## 2023-11-27 PROCEDURE — 97112 NEUROMUSCULAR REEDUCATION: CPT | Performed by: PHYSICAL THERAPIST

## 2023-11-27 PROCEDURE — 97110 THERAPEUTIC EXERCISES: CPT | Performed by: PHYSICAL THERAPIST

## 2023-11-27 PROCEDURE — 97140 MANUAL THERAPY 1/> REGIONS: CPT | Performed by: PHYSICAL THERAPIST

## 2023-11-27 NOTE — PROGRESS NOTES
Daily Note     Today's date: 2023  Patient name: Clifford Marinelli  : 2000  MRN: [id number]  Referring provider: Bernard Holder  Dx:   Encounter Diagnosis     ICD-10-CM    1. Superior glenoid labrum lesion of right shoulder, subsequent encounter  S43.431D           Start Time: 1045  Stop Time: 1130  Total time in clinic (min): 45 minutes    Subjective: Pt reports feeling good. Says he went to go pick something up with his left arm and that felt weak so he used his right instead. Objective: See treatment diary below      Assessment: Tolerated treatment well. Pt continues to progress as expected. He demonstrates some scapular asymmetries on his left side. Pt's scapula was assessed during push ups and demonstrated increased winging of his left scap minesh when fatigued. Continue progressing pt as he can tolerated. He continues to demonstrate weakness and fatigue when performing landmine press and push ups. Patient demonstrated fatigue post treatment, exhibited good technique with therapeutic exercises, and would benefit from continued PT      Plan: Continue per plan of care.       Precautions: Surgery 23 - see protocol     Date 11/27 10/23 10/30 11/1 11/6 11/8 11/13 11/15 11/20 11/22   Manuals                       STM Lats, teres major, subscap 10'    Lats/subscap/teres amilcar WM 8' Lats/subscap/teres major 8' Lats/subscap/teres major WM 8'  Pec Pec, teres major    Pec, teres major, lats, sub scap 10'   Assessment   15'                                                                     Neuro Re-Ed                       AROM                       Bear crawl Banded 1x10'   fwd/bwd lateral 2x10' each  fwd/bwd lateral 2x20' ea               Bodyblade       OH fwd and lat  2x20" ea               Single arm band perturbations        Green 3x20'  Green 3x30'      Green CW/CCW 1x30" 1x20"       Rhythmic Stab         2x30" 8 lb KB bottoms up     Push up [place] 2x15  Flies 2x15    On floor 1x5 1x3 On green jump band 3x5  On floor  3x3    On GJB  Peg on 14 3x8  On plinth 3x5  redcord - 2x10, Flys 2x10 [place] 2x12  Flies 2x10 [place] 2x15  Flies 2x12    Ball toss          Tennis ball 3x15    D2 ext w/ ball toss           2x8  2x8  2x8  2x10 2nd smallest ball    Body blade  D2, OH fwd/bw, med/lat 2x20"           20"x2  Frong and side     2x20 D2 and OH fwd/bwd med/lat   Ther Ex 11/27 10/23                   PROM WM AB WM WM  WM WM AB WM WM WM   pendulum                     Ts, Ys, Is  2x10 ea T's purple 2x10  Y's green 2x10 T's purple 2x10 3"  Y's green 2x10 3" T's yellow 2x10    W into Y raise Green 2x12 3"  W into Y raise green 2x15 3" W to Y - green XS 3#    T XS 7#  3x5-7 reps      UBE 3'/3' lvl 5 2'/2' 2'/2' 3'/3' 3'/3' 3'/3' lvl 5 3'/3' lvl 2 3'/3' 5 3'/3' lvl 6    Punches            Toya: 15#  2x10  [place]: 15 lb 2x12       Scap Iso          Rows 10 lb 2x10 2" hold  A's 7 lbs 2x10 2" hold           Landmine Press 54 1x12 1x10        55 lb 2x12    IR/ER  Toya 3#   ER 2x10  6#  2x10 Toya 4 lb ER 2x10 8 lb 2x10 Toya 6 lb ER   1x8  5 lb 1x10  12 lb IR 2x10 [place] 7 lb ER   2x8  12 lb IR 2x10  Toya 5 lb ER 2x15  IR 12 lb 2x12 [place] 9#  2x8    [place] 6# 3x6  90/90 GTB single ER 2x10 3"  IR GTB double 2x10 3" 90/90 OTB single ER 3x12     IR GTB double 2x12   Rows     30 lb 2x10                A's  XS  2x10  3#  Yellow 2x10               Shoulder taps  30"x3                  Swinging        Bat 2x10     Wall slides  On redcord - 2x10  On red cord 1x10  W/ one arm lift off 2x10 3" hold              Ther Activity                    Bike   6' lvl 5    6' lvl 6                             Gait Training                                                                      Modalities

## 2023-11-29 ENCOUNTER — OFFICE VISIT (OUTPATIENT)
Dept: PHYSICAL THERAPY | Facility: REHABILITATION | Age: 23
End: 2023-11-29
Payer: COMMERCIAL

## 2023-11-29 DIAGNOSIS — S43.431D SUPERIOR GLENOID LABRUM LESION OF RIGHT SHOULDER, SUBSEQUENT ENCOUNTER: Primary | ICD-10-CM

## 2023-11-29 PROCEDURE — 97112 NEUROMUSCULAR REEDUCATION: CPT | Performed by: PHYSICAL THERAPIST

## 2023-11-29 PROCEDURE — 97110 THERAPEUTIC EXERCISES: CPT | Performed by: PHYSICAL THERAPIST

## 2023-11-29 PROCEDURE — 97140 MANUAL THERAPY 1/> REGIONS: CPT | Performed by: PHYSICAL THERAPIST

## 2023-11-29 NOTE — PROGRESS NOTES
Daily Note     Today's date: 2023  Patient name: Fallon Cook  : 2000  MRN: [id number]  Referring provider: Yordy Crum  Dx:   Encounter Diagnosis     ICD-10-CM    1. Superior glenoid labrum lesion of right shoulder, subsequent encounter  S43.431D           Start Time: 0815  Stop Time: 0910  Total time in clinic (min): 55 minutes    Subjective: Pt reports feeling well. He has begun performing his HEP for his left shoulder as well to address weakness. Objective: See treatment diary below      Assessment: Tolerated treatment well. Pt continues to progress as expected regarding motor control, strength, and endurance. Pt's ROM and end feel are improved after the pt performed foam rolling as part of his HEP. Added in rowing on red cord which the pt tolerated well. Pt reports feeling fatigue bilaterally on this exercise. Continue progressing pt as he can tolerate. Patient demonstrated fatigue post treatment, exhibited good technique with therapeutic exercises, and would benefit from continued PT      Plan: Continue per plan of care.       Precautions: Surgery 23 - see protocol     Date 11/27 11/29 10/30 11/1 11/6 11/8 11/13 11/15 11/20 11/22   Manuals                       STM Lats, teres major, subscap 10'  Lats, teres major, subscap 10'  Lats/subscap/teres amilcar WM 8' Lats/subscap/teres major 8' Lats/subscap/teres major WM 8'  Pec Pec, teres major    Pec, teres major, lats, sub scap 10'   Assessment   15'                                                                     Neuro Re-Ed                       AROM                       Bear crawl Banded 1x10'   fwd/bwd lateral 2x10' each  fwd/bwd lateral 2x20' ea               Bodyblade       OH fwd and lat  2x20" ea               Single arm band perturbations        Green 3x20'  Green 3x30'      Green CW/CCW 1x30" 1x20"       Rhythmic Stab         2x30" 8 lb KB bottoms up     Push up [place] 2x15  Flies 2x15    On floor 1x5 1x3  On plinth 3x8    On green jump band 3x5  On floor  3x3    On GJB  Peg on 14 3x8  On plinth 3x5  redcord - 2x10, Flys 2x10 [place] 2x12  Flies 2x10 [place] 2x15  Flies 2x12    Ball toss  Tennis ball 3x20        Tennis ball 3x15    D2 ext w/ ball toss   2x10 2nd smallest ball         2x8  2x8  2x8  2x10 2nd smallest ball    Body blade  D2, OH fwd/bw, med/lat 2x20"           20"x2  Frong and side     2x20 D2 and OH fwd/bwd med/lat   Ther Ex 11/27 11/29                   PROM WM WM WM WM  WM WM AB WM WM WM   pendulum                     Ts, Ys, Is   T's purple 2x10  Y's green 2x10 T's purple 2x10 3"  Y's green 2x10 3" T's yellow 2x10    W into Y raise Green 2x12 3"  W into Y raise green 2x15 3" W to Y - green XS 3#    T XS 7#  3x5-7 reps      UBE 3'/3' lvl 5 3'/3' lvl 5 2'/2' 3'/3' 3'/3' 3'/3' lvl 5 3'/3' lvl 2 3'/3' 5 3'/3' lvl 6    Punches    Toya 15 lb 2x15        [place]: 15#  2x10  [place]: 15 lb 2x12       Scap Iso          Rows 10 lb 2x10 2" hold  A's 7 lbs 2x10 2" hold           Landmine Press 54 1x12 1x10        55 lb 2x12    IR/ER   [place] 4 lb ER 2x10 8 lb 2x10 [place] 6 lb ER   1x8  5 lb 1x10  12 lb IR 2x10 [place] 7 lb ER   2x8  12 lb IR 2x10  Toya 5 lb ER 2x15  IR 12 lb 2x12 [place] 9#  2x8    [place] Kirstin 6# 3x6  90/90 GTB single ER 2x10 3"  IR GTB double 2x10 3" 90/90 OTB single ER 3x12     IR GTB double 2x12   Rows   Red cord 2x6  2x7   30 lb 2x10                A's    Yellow 2x10               Shoulder taps                    Swinging        Bat 2x10     Wall slides    On red cord 1x10  W/ one arm lift off 2x10 3" hold              Ther Activity                    Bike   6' lvl 5    6' lvl 6                             Gait Training                                                                      Modalities

## 2023-12-04 ENCOUNTER — OFFICE VISIT (OUTPATIENT)
Dept: PHYSICAL THERAPY | Facility: REHABILITATION | Age: 23
End: 2023-12-04
Payer: COMMERCIAL

## 2023-12-04 DIAGNOSIS — S43.431D SUPERIOR GLENOID LABRUM LESION OF RIGHT SHOULDER, SUBSEQUENT ENCOUNTER: Primary | ICD-10-CM

## 2023-12-04 PROCEDURE — 97112 NEUROMUSCULAR REEDUCATION: CPT | Performed by: PHYSICAL THERAPIST

## 2023-12-04 PROCEDURE — 97110 THERAPEUTIC EXERCISES: CPT | Performed by: PHYSICAL THERAPIST

## 2023-12-04 NOTE — PROGRESS NOTES
Daily Note     Today's date: 2023  Patient name: Antonia Torres  : 2000  MRN: [id number]  Referring provider: Yovani Gomes  Dx:   Encounter Diagnosis     ICD-10-CM    1. Superior glenoid labrum lesion of right shoulder, subsequent encounter  S43.431D           Start Time: 07  Stop Time: 0815  Total time in clinic (min): 45 minutes    Subjective: Pt reports feeling well. No new updates. Objective: See treatment diary below      Assessment: Tolerated treatment well. Pt continues to progress well. Added in more plyometrics which the pt is tolerating well with no issue. He continues to demonstrate some motor control deficits when comparing b/l during push ups and rows. This may be due to weakness or decrease in periscapular stability. Added in light tossing with a baseball which the pt tolerated well. Demonstrates decreased layback during throwing motion. Continue progressing the pt as tolerate. Patient demonstrated fatigue post treatment, exhibited good technique with therapeutic exercises, and would benefit from continued PT      Plan: Continue per plan of care.       Precautions: Surgery 23 - see protocol     Date 11/27 11/29 12/4 11/1 11/6 11/8 11/13 11/15 11/20 11/22   Manuals                       STM Lats, teres major, subscap 10'  Lats, teres major, subscap 10'  Lats/subscap/teres major 8' Lats/subscap/teres major WM 8'  Pec Pec, teres major    Pec, teres major, lats, sub scap 10'   Assessment   15'                                                                     Neuro Re-Ed                       AROM                       Bear crawl Banded 1x10'     fwd/bwd lateral 2x20' ea               Bodyblade      OH fwd and lat  2x20" ea               Single arm band perturbations        Green 3x20'  Green 3x30'      Green CW/CCW 1x30" 1x20"       Rhythmic Stab         2x30" 8 lb KB bottoms up     Push up [place] 2x15  Flies 2x15    On floor 1x5 1x3  On plinth 3x8  On hi-low lowest setting 3x7  On green jump band 3x5  On floor  3x3    On GJB  Peg on 14 3x8  On plinth 3x5  redcord - 2x10, Flys 2x10 [place] 2x12  Flies 2x10 [place] 2x15  Flies 2x12    Band plys   Press 3x6  OJB    ER GTB  3x6          Ball toss  Tennis ball 3x20 [place] 40' 1x20       Tennis ball 3x15    D2 ext w/ ball toss   2x10 2nd smallest ball  2x10 2nd smallest ball       2x8  2x8  2x8  2x10 2nd smallest ball    Body blade  D2, OH fwd/bw, med/lat 2x20"           20"x2  Frong and side     2x20 D2 and OH fwd/bwd med/lat   Ther Ex 11/27 11/29                   PROM WM WM WM WM  WM WM AB WM WM WM   pendulum                     Ts, Ys, Is    T's purple 2x10 3"  Y's green 2x10 3" T's yellow 2x10    W into Y raise Green 2x12 3"  W into Y raise green 2x15 3" W to Y - green XS 3#    T XS 7#  3x5-7 reps      UBE 3'/3' lvl 5 3'/3' lvl 5 3'/3' lvl 5 3'/3' 3'/3' 3'/3' lvl 5 3'/3' lvl 2 3'/3' 5 3'/3' lvl 6    Punches    [place] 15 lb 2x15       Toya: 15#  2x10  [place]: 15 lb 2x12       Scap Iso          Rows 10 lb 2x10 2" hold  A's 7 lbs 2x10 2" hold           Landmine Press 55 1x12 1x10        55 lb 2x12    IR/ER    [place] 6 lb ER   1x8  5 lb 1x10  12 lb IR 2x10 [place] 7 lb ER   2x8  12 lb IR 2x10  Toya 5 lb ER 2x15  IR 12 lb 2x12 Toya 9#  2x8    Rolm Gin 6# 3x6  90/90 GTB single ER 2x10 3"  IR GTB double 2x10 3" 90/90 OTB single ER 3x12     IR GTB double 2x12   Rows   Red cord 2x6  2x7  Red cord 2x7  1x6                A's                  Shoulder taps                    Swinging        Bat 2x10     Wall slides                 Ther Activity                   Bike       6' lvl 6                             Gait Training                                                                      Modalities

## 2023-12-06 ENCOUNTER — EVALUATION (OUTPATIENT)
Dept: PHYSICAL THERAPY | Facility: REHABILITATION | Age: 23
End: 2023-12-06
Payer: COMMERCIAL

## 2023-12-06 DIAGNOSIS — S43.431D SUPERIOR GLENOID LABRUM LESION OF RIGHT SHOULDER, SUBSEQUENT ENCOUNTER: Primary | ICD-10-CM

## 2023-12-06 PROCEDURE — 97140 MANUAL THERAPY 1/> REGIONS: CPT | Performed by: PHYSICAL THERAPIST

## 2023-12-06 PROCEDURE — 97110 THERAPEUTIC EXERCISES: CPT | Performed by: PHYSICAL THERAPIST

## 2023-12-06 NOTE — PROGRESS NOTES
Re-Evaluation Note     Today's date: 2023  Patient name: Linsey Michaud  : 2000  MRN: [id number]  Referring provider: Norm Burkitt  Dx:   Encounter Diagnosis     ICD-10-CM    1. Superior glenoid labrum lesion of right shoulder, subsequent encounter  S43.431D           Start Time: 0815  Stop Time: 0900  Total time in clinic (min): 45 minutes    Subjective: Pt reports feeling well. No new updates. Objective: See treatment diary below. ER PROM 100. GROC: 90% 10% remaining is returning to baseball    R MMT:  Lower trap 4  Mid trap 4  ER 5  IR 5    Short Term Goals:   1. Patient will be Independent with hep- MET  2. Patient will improve pain with activity by 50%- MET  3. Patient will report GROC 50% or greater- MET  4. Patient will have PROM per protocol at 6 wk- MET  5. Patient will have AROM per protocol at 6 wk- MET      Long Term Goals:   1. Patient will improve FOTO to greater then goal   2. Patient will improve pain with activity to 2/10 or less - MET  3. Patient will continue with HEP independence to allow for decreased future reoccurrence of pain and loss in function - MET  4. Patient will report GROC 75% or greater - MET  5. Patient will have 4+/5 all planes shoulder - Improved  6. Patient will throw a baseball pain free - Improved  7. Patient will make 10 throws at 75% from 60 ft pain free - NOT MET  8. Patient will resume upper extremity lifting at gym pain free - MET    Push/Pull test  Push: 6, 3, 4  Pull: 7, 6, 2    Assessment: Pt was re-evaluated today he demonstrated improvements in his strength, ROM, and function. He continues to have deficits with function, ROM, and confidence with dynamic motions when compared b/l. He This is affecting his ability to throw and return to leisurely activities. He currently does not have pain w/ ADLs but complains of tightness when reaching overhead.  The pt would continue to benefit from PT to improve upon these deficits to return him to his baseline level of function. Plan: Continue per plan of care.       Precautions: Surgery 9/6/23 - see protocol     Date 11/27 11/29 12/4 11/1 11/6 11/8 11/13 11/15 11/20 11/22   Manuals                       STM Lats, teres major, subscap 10'  Lats, teres major, subscap 10'  Lats/subscap/teres major 8' Lats/subscap/teres major WM 8'  Pec Pec, teres major    Pec, teres major, lats, sub scap 10'   Assessment   15'    35'                                                                 Neuro Re-Ed                       AROM                       Bear crawl Banded 1x10'     fwd/bwd lateral 2x20' ea               Bodyblade      OH fwd and lat  2x20" ea               Single arm band perturbations        Green 3x20'  Green 3x30'      Green CW/CCW 1x30" 1x20"       Rhythmic Stab         2x30" 8 lb KB bottoms up     Push up [place] 2x15  Flies 2x15    On floor 1x5 1x3  On plinth 3x8   On green jump band 3x5  On floor  3x3    On GJB  Peg on 14 3x8  On plinth 3x5  redcord - 2x10, Flys 2x10 [place] 2x12  Flies 2x10 [place] 2x15  Flies 2x12    Band plys             Ball toss  Tennis ball 3x20        Tennis ball 3x15    D2 ext w/ ball toss   2x10 2nd smallest ball        2x8  2x8  2x8  2x10 2nd smallest ball    Body blade  D2, OH fwd/bw, med/lat 2x20"           20"x2  Frong and side     2x20 D2 and OH fwd/bwd med/lat   Ther Ex 11/27 11/29                   PROM WM WM WM WM  WM WM AB WM WM WM   pendulum                     Ts, Ys, Is    T's purple 2x10 3"  Y's green 2x10 3" T's yellow 2x10    W into Y raise Green 2x12 3"  W into Y raise green 2x15 3" W to Y - green XS 3#    T XS 7#  3x5-7 reps      UBE 3'/3' lvl 5 3'/3' lvl 5 3'/3' lvl 5 3'/3' 3'/3' 3'/3' lvl 5 3'/3' lvl 2 3'/3' 5 3'/3' lvl 6    Punches    [place] 15 lb 2x15       Toya: 15#  2x10  [place]: 15 lb 2x12       Scap Iso          Rows 10 lb 2x10 2" hold  A's 7 lbs 2x10 2" hold           Landmine Press 55 1x12 1x10  Barbell  3x8 explosive ea      55 lb 2x12 IR/ER    Toya 6 lb ER   1x8  5 lb 1x10  12 lb IR 2x10 [place] 7 lb ER   2x8  12 lb IR 2x10  [place] 5 lb ER 2x15  IR 12 lb 2x12 Toya 9#  2x8    Alyssia Ibanez 6# 3x6  90/90 GTB single ER 2x10 3"  IR GTB double 2x10 3" 90/90 OTB single ER 3x12     IR GTB double 2x12   Rows   Red cord 2x6  2x7                 A's                  Shoulder taps                    Swinging        Bat 2x10     Wall slides                 Ther Activity                   Bike       6' lvl 6                             Gait Training                                                                      Modalities

## 2023-12-13 ENCOUNTER — OFFICE VISIT (OUTPATIENT)
Dept: PHYSICAL THERAPY | Facility: REHABILITATION | Age: 23
End: 2023-12-13
Payer: COMMERCIAL

## 2023-12-13 DIAGNOSIS — S43.431D SUPERIOR GLENOID LABRUM LESION OF RIGHT SHOULDER, SUBSEQUENT ENCOUNTER: Primary | ICD-10-CM

## 2023-12-13 PROCEDURE — 97140 MANUAL THERAPY 1/> REGIONS: CPT | Performed by: PHYSICAL THERAPIST

## 2023-12-13 PROCEDURE — 97110 THERAPEUTIC EXERCISES: CPT | Performed by: PHYSICAL THERAPIST

## 2023-12-13 NOTE — PROGRESS NOTES
Daily Note     Today's date: 2023  Patient name: Aron Townsend  : 2000  MRN: [id number]  Referring provider: Orquidea Page  Dx:   Encounter Diagnosis     ICD-10-CM    1. Superior glenoid labrum lesion of right shoulder, subsequent encounter  S43.431D           Start Time: 0900  Stop Time: 0945  Total time in clinic (min): 45 minutes    Subjective: Patient reports feeing well at this time. No complaints. Objective: See treatment diary below      Assessment: Tolerated treatment well. Patient demonstrated fatigue post treatment and exhibited good technique with therapeutic exercises. Patient continues to progress very well with all exercises incorporating strength and function with OH activity. Min cues for form with scap strengthening. Plan: Continue per plan of care.       Precautions: Surgery 23 - see protocol     Date 11/27 11/29 12/4 12/13 11/6 11/8 11/13 11/15 11/20 11/22   Manuals                       STM Lats, teres major, subscap 10'  Lats, teres major, subscap 10'   Lats/subscap/teres major WM 8'  Pec Pec, teres major    Pec, teres major, lats, sub scap 10'   Assessment   15'                                                                  Neuro Re-Ed                      AROM                     Throwing    Football- 3'         Kneeling BOSU 90/90 press    5#  3xF         Rapid OHP    YJB  2x10  10x with perturbation EC               Bodyblade                     Single arm band perturbations         Green 3x30'      Green CW/CCW 1x30" 1x20"       Rhythmic Stab         2x30" 8 lb KB bottoms up     Push up [place] 2x15  Flies 2x15    On floor 1x5 1x3  On plinth 3x8    On floor  3x3    On GJB  Peg on 14 3x8  On plinth 3x5  redcord - 2x10, Flys 2x10 [place] 2x12  Flies 2x10 [place] 2x15  Flies 2x12    Band plys             Ball toss  Tennis ball 3x20        Tennis ball 3x15    D2 ext w/ ball toss   2x10 2nd smallest ball       2x8  2x8  2x8  2x10 2nd smallest ball Body blade  D2, OH fwd/bw, med/lat 2x20"          20"x2  Frong and side     2x20 D2 and OH fwd/bwd med/lat   Ther Ex 11/27 11/29                  Bear crawls    Ap and lateral 40 ft x2 ea         pendulum                    Ts, Ys, Is, Ws    7# XS  10x ea T's yellow 2x10    W into Y raise Green 2x12 3"  W into Y raise green 2x15 3" W to Y - green XS 3#    T XS 7#  3x5-7 reps      UBE 3'/3' lvl 5 3'/3' lvl 5 3'/3' lvl 5 L3  3'/3' 3'/3' 3'/3' lvl 5 3'/3' lvl 2 3'/3' 5 3'/3' lvl 6    Punches    [place] 15 lb 2x15       [place]: 15#  2x10  Toya: 15 lb 2x12       Scap Iso         Rows 10 lb 2x10 2" hold  A's 7 lbs 2x10 2" hold           Landmine Press 54 1x12 1x10  Barbell  3x8 explosive ea      55 lb 2x12    IR/ER     Toya 7 lb ER   2x8  12 lb IR 2x10  Toya 5 lb ER 2x15  IR 12 lb 2x12 [place] 9#  2x8    Peg Bolder 6# 3x6  90/90 GTB single ER 2x10 3"  IR GTB double 2x10 3" 90/90 OTB single ER 3x12     IR GTB double 2x12   Rows   Red cord 2x6  2x7                A's                 Shoulder taps                   Swinging        Bat 2x10     Wall slides                Ther Activity                  Bike       6' lvl 6                            Gait Training                                                                   Modalities

## 2023-12-18 ENCOUNTER — OFFICE VISIT (OUTPATIENT)
Dept: OBGYN CLINIC | Facility: OTHER | Age: 23
End: 2023-12-18
Payer: COMMERCIAL

## 2023-12-18 VITALS
DIASTOLIC BLOOD PRESSURE: 77 MMHG | SYSTOLIC BLOOD PRESSURE: 139 MMHG | WEIGHT: 212 LBS | HEART RATE: 74 BPM | BODY MASS INDEX: 25.03 KG/M2 | HEIGHT: 77 IN

## 2023-12-18 DIAGNOSIS — Z98.890 STATUS POST REPAIR OF GLENOID LABRUM: ICD-10-CM

## 2023-12-18 DIAGNOSIS — Z47.89 AFTERCARE FOLLOWING SURGERY OF THE MUSCULOSKELETAL SYSTEM: Primary | ICD-10-CM

## 2023-12-18 PROCEDURE — 99213 OFFICE O/P EST LOW 20 MIN: CPT | Performed by: PHYSICIAN ASSISTANT

## 2023-12-18 NOTE — PROGRESS NOTES
Assessment:       1. Aftercare following surgery of the musculoskeletal system    2. Status post repair of glenoid labrum          Plan:    Patient is doing well postoperatively and making good progress in PT. All questions were addressed to the patient's satisfaction. He is contiinue rehab protocol with PT. Follow-up is in 2 months to assess patient's progress.        Subjective:     Patient ID: Zain Saab is a 23 y.o. male.    Chief Complaint:    HPI    Patient presents for follow-up 3.5 months status post right shoulder SLAP and posterior labrum repair, 9/6/2023. He reports no new pain or feelings of instability.       Social History     Occupational History    Not on file   Tobacco Use    Smoking status: Never    Smokeless tobacco: Never   Vaping Use    Vaping status: Never Used   Substance and Sexual Activity    Alcohol use: Yes    Drug use: No    Sexual activity: Not Currently      Review of Systems   All other systems reviewed and are negative.          Objective:     Ortho ExamPhysical Exam  HENT:      Head: Atraumatic.   Cardiovascular:      Pulses: Normal pulses.   Pulmonary:      Effort: Pulmonary effort is normal.   Musculoskeletal:      Comments: Active range of motion right shoulder: Forward flexion 150 degrees, external rotation 90 degrees, internal rotation lumbar spine.   Neurological:      Mental Status: He is alert.

## 2023-12-18 NOTE — PATIENT INSTRUCTIONS
1. Continue PT per protocol  2. Follow-up in 2 months    Posterior Labral Repair Post-Operative Rehabilitation Protocol  Antonio Ladd MD  [facility]  [address] [address]  [place] [address]  [phone]  Phase I: Immediate Post-Operative Period (Weeks 0-3 Days 0-21)    Goals: Diminish Inflammation and Pain   Protect Integrity of the Repair   Progress PROM   Become Independent in Modified ADLs    Precautions:   Patient to Remain in Sling at all Times except Dressing/Bathing/PT until Week 3   Avoid Stressing Repair  No Shoulder IR Past Neutral for 6 weeks    Week 0-1 (Days 0-14)   Instruct in Dressing/Bathing/Toileting within Precautions    AROM wrist, hand, fingers    PROM elbow flexion/extension, shoulder supine forward elevation to 90°    Begin Codman Pendulums & Gentle Scapulothoracic Stabilization/Mobilization     Week 2-3 (Days 15-21)   Patient to Remain in Sling except for Exercises   Keep above but add the Following   PROM Shoulder, Flexion and Abduction 0-90°    PROM Shoulder ER with Elbow at the Side (measured relative to scapular plane)     Limit ER to 35°      No IR past 0°          Phase II: Moderate Protection Period (Weeks 3-6)    Goals: Allow Soft Tissue Healing   Gently Progress PROM to Regain Full PROM by Week 6  Except IR, none past Neutral until Week 6   No Glenohumeral Strengthening until Phase III    Precautions:   Sling for Patient Comfort Only   May begin to use Extremity for light ADLs (No Lifting Heavier than Coffee Cup)    Week 3-6 (Days 22-44)   Begin Lower Extremity and Core Strengthening, Light Cardio Training  PROM progressing to Full PROM at Week 6 (including ER with Shoulder in Abduction)   Begin Posterior Capsular Stretching if Necessary (added earlier if release done)   Progress Scapulothoracic Stabilization/Mobilization/Isometrics   If Full PROM Achieved, therapist may add AAROM in all Planes only after Week 4          Phase III: Early  Strengthening (Weeks 6-12)    Goals: Gain Full AROM or Maintain if Applicable   Gradual Return of Shoulder/Scapular Strength, Power and Endurance   Prepare for Return to Functional Activities    Precautions:   No Lifting > 10 lbs, Sudden Lifting or Pushing, Overhead Lifting    Week 6-12   Progress Gentle PREs in all Planes of Movement (add Biceps PREs last to protect repair)   Add Light PNF Patterns (Bodyblade/Plyoball/etc. in non-Provocative Positions)  Progressive Rotator Cuff Strengthening   Progress Scapulothoracic Stabilization/Mobilization/Strengthening          Phase IV: Functional Rehabilitation (Weeks 12-16)    Week 12-16   Continue Rotator Cuff and Elbow Flexion strengthening   Emphasize Rhythm and Timing with PNFs (Bodyblade Overhead, Plyoball Throwing)   Stabilize Glenohumeral and Scapulothoracic Joint in Functional Position   Continue Total Body Conditioning (Core, Cardio and Lower Extremity)    Month 4-5   Continue Strengthening/Stabilization/PNF   Begin Interval Throwing Program if ROM/Strength Adequate (Overhead Throwers)    Month 6-7   Begin Throwing from the [place] (50-75%) as Tolerated (If Applicable)    Month 7-9   Progress to Full velocity as Tolerated         NB: This is a general program which may be modified by the surgeon or the therapist in consultation with the surgeon based on intra-operative findings, additional procedures preformed, repair stability, and patient biological factors.  If in doubt, please check with my office for individual patient specifics.  The ultimate goal of the surgery and rehabilitation is to get the labral tear to heal with the least residual functional deficit of the shoulder due to stiffness.

## 2023-12-20 ENCOUNTER — APPOINTMENT (OUTPATIENT)
Dept: PHYSICAL THERAPY | Facility: REHABILITATION | Age: 23
End: 2023-12-20
Payer: COMMERCIAL

## 2023-12-27 ENCOUNTER — APPOINTMENT (OUTPATIENT)
Dept: PHYSICAL THERAPY | Facility: REHABILITATION | Age: 23
End: 2023-12-27
Payer: COMMERCIAL

## 2023-12-27 NOTE — PROGRESS NOTES
"Daily Note     Today's date: 2023  Patient name: Zain Saab  : 2000  MRN: [id number]  Referring provider: Antonio Ladd*  Dx: No diagnosis found.               Subjective: ***      Objective: See treatment diary below      Assessment: Tolerated treatment {Tolerated treatment :2189221309}. Patient {assessment:9762663252}      Plan: {PLAN:0485569258}     Precautions: Surgery 23 - see protocol     Date 11/27 11/29 12/4 12/13  11/8 11/13 11/15 11/20 11/22   Manuals                      STM Lats, teres major, subscap 10'  Lats, teres major, subscap 10'     Pec Pec, teres major    Pec, teres major, lats, sub scap 10'   Assessment   15'                                                               Neuro Re-Ed                     AROM                    Throwing    Football- 3'         Kneeling BOSU 90/90 press    5#  3xF         Rapid OHP    YJB  2x10  10x with perturbation EC              Bodyblade                     Single arm band perturbations              Green CW/CCW 1x30\" 1x20\"       Rhythmic Stab         2x30\" 8 lb KB bottoms up     Push up [place] 2x15  Flies 2x15    On floor 1x5 1x3  On plinth 3x8     On plinth 3x5  [place]cord - 2x10, Flys 2x10 [place] 2x12  Flies 2x10 [place] 2x15  Flies 2x12    Band plys             Ball toss  Tennis ball 3x20        Tennis ball 3x15    D2 ext w/ ball toss   2x10 2nd smallest ball      2x8  2x8  2x8  2x10 2nd smallest ball    Body blade  D2, OH fwd/bw, med/lat 2x20\"         20\"x2  Frong and side     2x20 D2 and OH fwd/bwd med/lat   Ther Ex                  Bear crawls    Ap and lateral 40 ft x2 ea         pendulum                   Ts, Ys, Is, Ws    7# XS  10x ea  W into Y raise green 2x15 3\" W to Y - green XS 3#    T XS 7#  3x5-7 reps      UBE 3'/3' lvl 5 3'/3' lvl 5 3'/3' lvl 5 L3  3'/3'  3'/3' lvl 5 3'/3' lvl 2 3'/3' 5 3'/3' lvl 6    Punches    [place] 15 lb 2x15       [place]: 15#  2x10  [place]: 15 lb 2x12       Scap Iso         Rows " "10 lb 2x10 2\" hold  A's 7 lbs 2x10 2\" hold           Landmine Press 55 1x12 1x10  Barbell  3x8 explosive ea      55 lb 2x12    IR/ER       [place] 5 lb ER 2x15  IR 12 lb 2x12 [place] 9#  2x8    /Toya 6# 3x6  90/90 GTB single ER 2x10 3\"  IR GTB double 2x10 3\" 90/90 OTB single ER 3x12     IR GTB double 2x12   Rows   Red cord 2x6  2x7               A's                Shoulder taps                  Swinging        Bat 2x10     Wall slides               Ther Activity                 Bike       6' lvl 6                           Gait Training                                                                Modalities                                                                                                    "

## 2024-01-03 ENCOUNTER — OFFICE VISIT (OUTPATIENT)
Dept: PHYSICAL THERAPY | Facility: REHABILITATION | Age: 24
End: 2024-01-03
Payer: COMMERCIAL

## 2024-01-03 DIAGNOSIS — S43.431D SUPERIOR GLENOID LABRUM LESION OF RIGHT SHOULDER, SUBSEQUENT ENCOUNTER: Primary | ICD-10-CM

## 2024-01-03 PROCEDURE — 97112 NEUROMUSCULAR REEDUCATION: CPT | Performed by: PHYSICAL THERAPIST

## 2024-01-03 NOTE — PROGRESS NOTES
"Daily Note     Today's date: 1/3/2024  Patient name: Zain Saab  : 2000  MRN: [id number]  Referring provider: Antonio Ladd*  Dx:   Encounter Diagnosis     ICD-10-CM    1. Superior glenoid labrum lesion of right shoulder, subsequent encounter  S43.431D           Start Time: 815  Stop Time: 905  Total time in clinic (min): 50 minutes    Subjective: Patient reports that he has been feeling good, no complaints at this time. Has kept up with exercises at home for shoulder strengthening.       Objective: See treatment diary below      Assessment: Tolerated treatment well. Patient with good form with all strengthening exercises overhead but he does continues to fatigue quickly with pushing movements. 3 rds performed with push ups today at 8-6-5 within a 20\" window. Patient to continue at home with working on pushing movements.       Plan: Continue per plan of care.      Precautions: Surgery 23 - see protocol     Date 11/27 11/29 12/4 12/13 1/3 11/8 11/13 11/15 11/20 11/22   Manuals                                                                          Neuro Re-Ed                     B/l ER YMB  10x  20\"x2 rapid            OH pull apart YMB  10x  20\"x2 rapid            Landmine press 45# bar  Rapid 3x8            Long sit shot put 2KG ball  10x            Push ups 20\"x3            Rows - redcord 20\"x3                                                                                          Ther Ex                  UBE L3  3/3'            Bear crawl 50' AP and ML                                                                                                                                                           Ther Activity                                                   Gait Training                                                                Modalities                                                                                                      "

## 2024-01-10 ENCOUNTER — OFFICE VISIT (OUTPATIENT)
Dept: PHYSICAL THERAPY | Facility: REHABILITATION | Age: 24
End: 2024-01-10
Payer: COMMERCIAL

## 2024-01-10 DIAGNOSIS — S43.431D SUPERIOR GLENOID LABRUM LESION OF RIGHT SHOULDER, SUBSEQUENT ENCOUNTER: Primary | ICD-10-CM

## 2024-01-10 PROCEDURE — 97140 MANUAL THERAPY 1/> REGIONS: CPT | Performed by: PHYSICAL THERAPIST

## 2024-01-10 PROCEDURE — 97112 NEUROMUSCULAR REEDUCATION: CPT | Performed by: PHYSICAL THERAPIST

## 2024-01-10 PROCEDURE — 97110 THERAPEUTIC EXERCISES: CPT | Performed by: PHYSICAL THERAPIST

## 2024-01-10 NOTE — PROGRESS NOTES
"Daily Note     Today's date: 1/10/2024  Patient name: Zain Saab  : 2000  MRN: [id number]  Referring provider: Antonio Ladd*  Dx:   Encounter Diagnosis     ICD-10-CM    1. Superior glenoid labrum lesion of right shoulder, subsequent encounter  S43.431D           Start Time: 820  Stop Time: 920  Total time in clinic (min): 60 minutes    Subjective: Patient reports doing well, did start a throwing program without pain. Does feel hesitant as he increases his distance.      Objective: See treatment diary below      Assessment: Tolerated treatment well. Patient with mild ROM deficits into horizontal ABD overhead. Addressed with STM and stretch at a wall as I feel this will help as he moves forward with throwing. He continues to fatigue with push ups which he is working on his own with.       Plan: Continue per plan of care.      Precautions: Surgery 23 - see protocol     Date 11/27 11/29 12/4 12/13 1/3 1/10 11/13 11/15 11/20 11/22   Manuals                                                                         Neuro Re-Ed                    MEd ball toss down - L diag      5 KG ball  3x6       1/2 kneeling pull through - to L      10#  3x6       B/l ER YMB  10x  20\"x2 rapid     Ws  XS 3#-10x  7#  2x10       OH pull apart YMB  10x  20\"x2 rapid            Landmine press 45# bar  Rapid 3x8     NP       Long sit shot put 2KG ball  10x     10x 3KG ball       Push ups 20\"x3     20\"x3       Rows - redcord 20\"x3     NP                                                                                     Ther Ex                 UBE L3  3/3'     L3  3'/3'       Bear crawl 50' AP and ML     30 ft 2x AP and rev, lateral 30 ft                                                                                                                                                      Ther Activity                                                 Gait Training                                                           "   Modalities

## 2024-01-16 NOTE — PROGRESS NOTES
"Daily Note     Today's date: 2024  Patient name: Zain Saab  : 2000  MRN: [id number]  Referring provider: Antonio Ladd*  Dx:   Encounter Diagnosis     ICD-10-CM    1. Superior glenoid labrum lesion of right shoulder, subsequent encounter  S43.431D           Start Time: 0900  Stop Time: 0945  Total time in clinic (min): 45 minutes    Subjective: Patient reports feeling well. States that he has no pain throwing but more hesitant.       Objective: See treatment diary below      Assessment: Tolerated treatment well. Patient demonstrated fatigue post treatment and exhibited good technique with therapeutic exercises. Improving with push strength as he was able to achieve 11 reps vs 8 prior.       Plan: Continue per plan of care.      Precautions: Surgery 23 - see protocol     Date 11/27 11/29 12/4 12/13 1/3 1/10 1/17      Manuals                                                                         Neuro Re-Ed                    Towel drill       3 rds      Toya punches       12#  3x7      MEd ball toss down - L diag      5 KG ball  3x6 5 KB ball and 20# ball  3x7 ea      1/2 kneeling pull through - to L      10#  3x6       B/l ER YMB  10x  20\"x2 rapid     Ws  XS 3#-10x  7#  2x10       OH pull apart YMB  10x  20\"x2 rapid            Landmine press 45# bar  Rapid 3x8     NP       Long sit shot put 2KG ball  10x     10x 3KG ball       Push ups 20\"x3     20\"x3 20\"x3      Rows - redcord 20\"x3     NP 20\"x3      TYW       XS 7# 2x8 ea                                                                       Ther Ex                 UBE L3  3/3'     L3  3'/3' 3'/3' L3      Bear crawl 50' AP and ML     30 ft 2x AP and rev, lateral 30 ft 30 ft 2x AP and rev, lateral 30 ft                                                                                                                                                     Ther Activity                                                 Gait Training          "                                                    Modalities

## 2024-01-17 ENCOUNTER — OFFICE VISIT (OUTPATIENT)
Dept: PHYSICAL THERAPY | Facility: REHABILITATION | Age: 24
End: 2024-01-17
Payer: COMMERCIAL

## 2024-01-17 DIAGNOSIS — S43.431D SUPERIOR GLENOID LABRUM LESION OF RIGHT SHOULDER, SUBSEQUENT ENCOUNTER: Primary | ICD-10-CM

## 2024-01-17 PROCEDURE — 97112 NEUROMUSCULAR REEDUCATION: CPT | Performed by: PHYSICAL THERAPIST

## 2024-01-17 PROCEDURE — 97110 THERAPEUTIC EXERCISES: CPT | Performed by: PHYSICAL THERAPIST

## 2024-01-31 ENCOUNTER — OFFICE VISIT (OUTPATIENT)
Dept: PHYSICAL THERAPY | Facility: REHABILITATION | Age: 24
End: 2024-01-31
Payer: COMMERCIAL

## 2024-01-31 DIAGNOSIS — S43.431D SUPERIOR GLENOID LABRUM LESION OF RIGHT SHOULDER, SUBSEQUENT ENCOUNTER: Primary | ICD-10-CM

## 2024-01-31 PROCEDURE — 97112 NEUROMUSCULAR REEDUCATION: CPT | Performed by: PHYSICAL THERAPIST

## 2024-01-31 PROCEDURE — 97140 MANUAL THERAPY 1/> REGIONS: CPT | Performed by: PHYSICAL THERAPIST

## 2024-01-31 NOTE — PROGRESS NOTES
"Daily Note     Today's date: 2024  Patient name: Zain Saab  : 2000  MRN: [id number]  Referring provider: Antonio Ladd*  Dx:   Encounter Diagnosis     ICD-10-CM    1. Superior glenoid labrum lesion of right shoulder, subsequent encounter  S43.431D           Start Time: 0815  Stop Time: 0900  Total time in clinic (min): 45 minutes    Subjective: Patient reports that he has been feeling very good. Throwing program feeling better, getting better extension in long throws. Feeling about 70% with throwing (effort)      Objective: See treatment diary below      Assessment: Tolerated treatment well. Patient ROM WNL as I am very happy with his horiz ABD and ER ROM to allow for lay back. He is progressing to push strength with 11 reps vs 5-6 reps in prior sessions. Worked in OHP which he did well with, no deficit or compensations noted.       Plan: Continue per plan of care.      Precautions: Surgery 23 - see protocol     Date 11/27 11/29 12/4 12/13 1/3 1/10 1/17 1/31     Manuals              ER mobility - AB 8'                                                           Neuro Re-Ed                    Towel drill       3 rds 3 rds     D2 flex/ext PNF        4 rds     [place] punches       12#  3x7      MEd ball toss down - L diag      5 KG ball  3x6 5 KB ball and 20# ball  3x7 ea 3x7 ea     1/2 kneeling pull through - to L      10#  3x6       B/l ER YMB  10x  20\"x2 rapid     Ws  XS 3#-10x  7#  2x10       OH pull apart YMB  10x  20\"x2 rapid            Landmine press 45# bar  Rapid 3x8     NP       Long sit shot put 2KG ball  10x     10x 3KG ball       Push ups 20\"x3     20\"x3 20\"x3 20\"x3     Rows - redcord 20\"x3     NP 20\"x3 20\"x3     TYW       XS 7# 2x8 ea      OHP        15#  2x8  20#  2x5                                                         Ther Ex                 UBE L3  3/3'     L3  3'/3' 3'/3' L3      Bear crawl 50' AP and ML     30 ft 2x AP and rev, lateral 30 ft 30 ft 2x AP and rev, " lateral 30 ft                                                                                                                                                     Ther Activity                                                 Gait Training                                                             Modalities

## 2024-02-07 ENCOUNTER — OFFICE VISIT (OUTPATIENT)
Dept: FAMILY MEDICINE CLINIC | Facility: CLINIC | Age: 24
End: 2024-02-07
Payer: COMMERCIAL

## 2024-02-07 VITALS
TEMPERATURE: 98 F | DIASTOLIC BLOOD PRESSURE: 90 MMHG | BODY MASS INDEX: 25.99 KG/M2 | HEART RATE: 79 BPM | WEIGHT: 220.13 LBS | HEIGHT: 77 IN | OXYGEN SATURATION: 98 % | RESPIRATION RATE: 18 BRPM | SYSTOLIC BLOOD PRESSURE: 140 MMHG

## 2024-02-07 DIAGNOSIS — Z00.00 WELL ADULT EXAM: ICD-10-CM

## 2024-02-07 DIAGNOSIS — F41.9 ANXIETY: ICD-10-CM

## 2024-02-07 DIAGNOSIS — Z11.1 TUBERCULOSIS SCREENING: Primary | ICD-10-CM

## 2024-02-07 PROCEDURE — 99395 PREV VISIT EST AGE 18-39: CPT | Performed by: FAMILY MEDICINE

## 2024-02-07 PROCEDURE — 86580 TB INTRADERMAL TEST: CPT

## 2024-02-07 NOTE — PROGRESS NOTES
FAMILY PRACTICE OFFICE VISIT       NAME: Zain Saab  AGE: 23 y.o. SEX: male       : 2000        MRN: [id number]    DATE: 2024  TIME: 11:52 AM    Assessment and Plan     Problem List Items Addressed This Visit       Anxiety     Anxiety.  Patient feels symptoms are fairly well-controlled on current dose of sertraline.         Tuberculosis screening - Primary    Relevant Orders    TB Skin Test (Completed)    Well adult exam     Well adult.  Overall the patient appears to be in stable health.  I reviewed his most recent blood test results from last year.  A PPD was placed on his forearm and he will return in 48 hours to recheck test.  His physical exam form was filled out for school.                Chief Complaint     Chief Complaint   Patient presents with    Well Check     Physical        History of Present Illness     Patient in the office for annual physical exam.  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.  He is also a  for the high school team.  He denies any recent illness.  He has recovered from surgery he had on his right shoulder for labral tear last year.        Review of Systems   Review of Systems   Constitutional: Negative.    HENT: Negative.     Eyes: Negative.    Respiratory: Negative.     Cardiovascular: Negative.    Gastrointestinal: Negative.    Genitourinary: Negative.    Musculoskeletal: Negative.    Skin: Negative.    Neurological: Negative.    Psychiatric/Behavioral:  The patient is nervous/anxious.        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    Internal derangement of right shoulder    Superior glenoid labrum lesion of right shoulder    Alopecia    Aftercare following surgery of the musculoskeletal system    Exposure to STD    Status post repair of glenoid labrum    Tuberculosis screening     Well adult exam       Past Medical History:  Past Medical History:   Diagnosis Date    Fractures     stress fx of Right foot and toe fx  : Pinkie finger right FX    Motion sickness        Past Surgical History:  Past Surgical History:   Procedure Laterality Date    CYST REMOVAL      upper Gum     FL INJECTION RIGHT ELBOW (ARTHROGRAM)  12/16/2019    FL INJECTION RIGHT SHOULDER (ARTHROGRAM)  9/24/2021    FL INJECTION RIGHT SHOULDER (ARTHROGRAM)  7/24/2023    NV SURGICAL ARTHROSCOPY SHOULDER CAPSULORRHAPHY Left 9/12/2018    Procedure: SHOULDER ARTHROSCOPY WITH LABRAL POSTERIOR AND ANTERIOR REPAIR;  Surgeon: Antonio Ladd MD;  [facility];  Service: Orthopedics    NV SURGICAL ARTHROSCOPY SHOULDER REPAIR SLAP LESION Right 9/6/2023    Procedure: SHOULDER ARTHROSCOPY WITH SLAP REPAIR AND POSTERIOR LABRAL REPAIR;  Surgeon: Antonio Ladd MD;  Location[facility];  Service: Orthopedics       Family History:  Family History   Problem Relation Age of Onset    Hypertension Mother     Heart attack Maternal Grandfather        Social History:  Social History     Socioeconomic History    Marital status: Single     Spouse name: Not on file    Number of children: Not on file    Years of education: Not on file    Highest education level: Not on file   Occupational History    Not on file   Tobacco Use    Smoking status: Never    Smokeless tobacco: Never   Vaping Use    Vaping status: Never Used   Substance and Sexual Activity    Alcohol use: Yes    Drug use: No    Sexual activity: Not Currently   Other Topics Concern    Not on file   Social History Narrative    Not on file     Social Determinants of Health     Financial Resource Strain: Not on file   Food Insecurity: Not on file   Transportation Needs: Not on file   Physical Activity: Not on file   Stress: Not on file   Social Connections: Not on file   Intimate Partner Violence: Not on file   Housing Stability: Not on file       Objective     Vitals:     02/07/24 0843   BP: 140/90   Pulse: 79   Resp: 18   Temp: 98 °F (36.7 °C)   SpO2: 98%     Wt Readings from Last 3 Encounters:   02/07/24 99.8 kg (220 lb 2 oz)   12/18/23 96.2 kg (212 lb)   10/23/23 89.6 kg (197 lb 8.5 oz)       Physical Exam  Constitutional:       General: He is not in acute distress.     Appearance: Normal appearance. He is not ill-appearing.   HENT:      Head: Normocephalic and atraumatic.      Right Ear: Tympanic membrane, ear canal and external ear normal. There is no impacted cerumen.      Left Ear: Tympanic membrane, ear canal and external ear normal. There is no impacted cerumen.   Eyes:      General:         Right eye: No discharge.         Left eye: No discharge.      Extraocular Movements: Extraocular movements intact.      Conjunctiva/sclera: Conjunctivae normal.      Pupils: Pupils are equal, round, and reactive to light.   Neck:      Vascular: No carotid bruit.   Cardiovascular:      Rate and Rhythm: Normal rate and regular rhythm.      Heart sounds: Normal heart sounds. No murmur heard.  Pulmonary:      Effort: Pulmonary effort is normal.      Breath sounds: Normal breath sounds. No wheezing, rhonchi or rales.   Abdominal:      General: Abdomen is flat. Bowel sounds are normal. There is no distension.      Palpations: Abdomen is soft.      Tenderness: There is no abdominal tenderness. There is no guarding or rebound.   Musculoskeletal:      Right lower leg: No edema.      Left lower leg: No edema.   Lymphadenopathy:      Cervical: No cervical adenopathy.   Skin:     Findings: No rash.   Neurological:      General: No focal deficit present.      Mental Status: He is alert and oriented to person, place, and time.      Cranial Nerves: No cranial nerve deficit.   Psychiatric:         Mood and Affect: Mood normal.         Behavior: Behavior normal.         Thought Content: Thought content normal.         Judgment: Judgment normal.         Pertinent Laboratory/Diagnostic Studies:  Lab  "Results   Component Value Date    BUN 16 10/09/2023    CREATININE 1.19 10/09/2023    CALCIUM 10.2 10/09/2023    K 3.7 10/09/2023    CO2 27 10/09/2023     10/09/2023     Lab Results   Component Value Date    ALT 14 10/09/2023    AST 17 10/09/2023    ALKPHOS 65 10/09/2023       Lab Results   Component Value Date    WBC 4.80 10/09/2023    HGB 15.6 10/09/2023    HCT 45.7 10/09/2023    MCV 85 10/09/2023     10/09/2023       No results found for: \"TSH\"    No results found for: \"CHOL\"  Lab Results   Component Value Date    TRIG 51 10/09/2023     Lab Results   Component Value Date    HDL 56 10/09/2023     Lab Results   Component Value Date    LDLCALC 105 (H) 10/09/2023     No results found for: \"HGBA1C\"    Results for orders placed or performed in visit on 10/18/23   Hepatitis C antibody   Result Value Ref Range    Hepatitis C Ab Non-reactive Non-Reactive       Orders Placed This Encounter   Procedures    TB Skin Test       ALLERGIES:  No Known Allergies    Current Medications     Current Outpatient Medications   Medication Sig Dispense Refill    sertraline (Zoloft) 50 mg tablet Take 1 tablet (50 mg total) by mouth daily 30 tablet 5    DULoxetine (CYMBALTA) 30 mg delayed release capsule Take 1 capsule (30 mg total) by mouth daily (Patient not taking: Reported on 12/18/2023) 30 capsule 5    triamcinolone (KENALOG) 0.5 % cream Apply topically 2 (two) times a day (Patient not taking: Reported on 12/18/2023) 15 g 2     No current facility-administered medications for this visit.         Health Maintenance     Health Maintenance   Topic Date Due    HPV Vaccine (1 - Male 2-dose series) Never done    COVID-19 Vaccine (3 - 2023-24 season) 09/01/2023    PT PLAN OF CARE  10/11/2023    Annual Physical  09/25/2024    Depression Screening  10/17/2024    DTaP,Tdap,and Td Vaccines (7 - Td or Tdap) 11/18/2032    Zoster Vaccine (1 of 2) 09/29/2050    HIV Screening  Completed    Hepatitis C Screening  Completed    Pneumococcal " Vaccine: Pediatrics (0 to 5 Years) and At-Risk Patients (6 to 64 Years)  Completed    HIB Vaccine  Completed    IPV Vaccine  Completed    Hepatitis A Vaccine  Completed    Meningococcal ACWY Vaccine  Completed    Influenza Vaccine  Completed     Immunization History   Administered Date(s) Administered    COVID-19 PFIZER VACCINE 0.3 ML IM 06/23/2021, 07/14/2021    DTaP,unspecified 2000, 01/16/2001, 04/10/2001, 10/17/2005    Hep A, ped/adol, 2 dose 02/03/2010, 02/11/2011    Hep B, Adolescent or Pediatric 04/10/2001, 07/26/2001, 01/25/2002    HiB 2000, 01/16/2001, 04/10/2001, 10/12/2001    INFLUENZA 11/16/2017, 11/10/2021, 09/25/2023    IPV 2000, 01/16/2001, 01/25/2002, 10/17/2005    Influenza, injectable, quadrivalent, preservative free 0.5 mL 11/10/2021, 11/18/2022, 09/25/2023    MMR 10/12/2001, 10/13/2004    Meningococcal ACWY, unspecified 02/16/2012, 11/17/2017    Meningococcal B, Recombinant (TRUMENBA) 11/16/2017, 08/14/2018    Pneumococcal Conjugate 13-Valent 2000, 01/16/2001, 04/10/2001, 01/25/2002    Tdap 02/16/2012, 11/18/2022    Tuberculin Skin Test-PPD Intradermal 02/07/2024    Varicella 10/12/2001, 01/02/2007       Bud Wilson MD

## 2024-02-07 NOTE — ASSESSMENT & PLAN NOTE
Well adult.  Overall the patient appears to be in stable health.  I reviewed his most recent blood test results from last year.  A PPD was placed on his forearm and he will return in 48 hours to recheck test.  His physical exam form was filled out for school.

## 2024-02-09 DIAGNOSIS — F41.9 ANXIETY: ICD-10-CM

## 2024-02-09 LAB
INDURATION: NORMAL MM
TB SKIN TEST: NEGATIVE

## 2024-02-09 RX ORDER — SERTRALINE HYDROCHLORIDE 100 MG/1
100 TABLET, FILM COATED ORAL DAILY
Qty: 90 TABLET | Refills: 1 | Status: SHIPPED | OUTPATIENT
Start: 2024-02-09

## 2024-02-13 NOTE — PROGRESS NOTES
Reassessment and Discharge    Today's date: 2024  Patient name: Zain Saab  : 2000  MRN: [id number]  Referring provider: Antonio Ladd*  Dx:   Encounter Diagnosis     ICD-10-CM    1. Superior glenoid labrum lesion of right shoulder, subsequent encounter  S43.431D           Start Time: 0730  Stop Time: 0830  Total time in clinic (min): 60 minutes    Subjective:       Objective: See treatment diary below    Pain: 0/10    AROM/PROM WNL ( deg)  MMT: 4+/5 all planes    Short Term Goals:   1. Patient will be Independent with hep - MET  2. Patient will improve pain with activity by 50% - MET  3. Patient will report GROC 50% or greater - MET  4. Patient will have PROM per protocol at 6 wk - MET  5. Patient will have AROM per protocol at 6 wk - MET      Long Term Goals:   1. Patient will improve FOTO to greater then goal - MET  2. Patient will improve pain with activity to 2/10 or less - MET  3. Patient will continue with HEP independence to allow for decreased future reoccurrence of pain and loss in function - MET  4. Patient will report GROC 75% or greater - MET  5. Patient will have 4+/5 all planes shoulder - MET  6. Patient will throw a baseball pain free - MET  7. Patient will make 10 throws at 75% from 60 ft pain free - MET  8. Patient will resume upper extremity lifting at gym pain free - MET      Assessment: Patient is a 23 y.o. year old male who attended physical therapy regarding right shoulder labral repair. Patient reports 70% improvement in throwing at this time which correlates to improved impairments and functionality.  Patient has shown improvement throughout PT by demonstrating decreased pain, increased range of motion, increased strength, and improved tolerance to activity. Patient has resumed throwing and baseball activity with throws up 90 feet and swinging. We worked in UE lifting today with pressing overhead, front and rowing. Patient had a good handle on  "continue UE strengthening of RC, scap and core. Patient to continue with current throwing program.         Plan:      Precautions: Surgery 9/6/23 - see protocol     Date 11/27 11/29 12/4 12/13 1/3 1/10 1/17 1/31 2/14    Manuals              ER mobility - AB 8'       assessment         10'                                           Neuro Re-Ed                    Towel drill       3 rds 3 rds     D2 flex/ext PNF        4 rds     Collison punches       12#  3x7      MEd ball toss down - L diag      5 KG ball  3x6 5 KB ball and 20# ball  3x7 ea 3x7 ea     1/2 kneeling pull through - to L      10#  3x6       B/l ER YMB  10x  20\"x2 rapid     Ws  XS 3#-10x  7#  2x10       OH pull apart YMB  10x  20\"x2 rapid            Landmine press 45# bar  Rapid 3x8     NP       Long sit shot put 2KG ball  10x     10x 3KG ball       Push ups 20\"x3     20\"x3 20\"x3 20\"x3 20\"x3    Rows - redcord 20\"x3     NP 20\"x3 20\"x3 20\"x3    TYW       XS 7# 2x8 ea  XS 3#  10x  XS7#  10x    OHP        15#  2x8  20#  2x5                                                         Ther Ex                 UBE L3  3/3'     L3  3'/3' 3'/3' L3  3'/3'    Bear crawl 50' AP and ML     30 ft 2x AP and rev, lateral 30 ft 30 ft 2x AP and rev, lateral 30 ft      Press         3x8  15#    Push         3x8  30#    row         3x8  40#                                                                                                            Ther Activity                                                 Gait Training                                                             Modalities                                                                                                             "

## 2024-02-14 ENCOUNTER — OFFICE VISIT (OUTPATIENT)
Dept: PHYSICAL THERAPY | Facility: REHABILITATION | Age: 24
End: 2024-02-14
Payer: COMMERCIAL

## 2024-02-14 DIAGNOSIS — S43.431D SUPERIOR GLENOID LABRUM LESION OF RIGHT SHOULDER, SUBSEQUENT ENCOUNTER: Primary | ICD-10-CM

## 2024-02-14 PROCEDURE — 97112 NEUROMUSCULAR REEDUCATION: CPT | Performed by: PHYSICAL THERAPIST

## 2024-02-14 PROCEDURE — 97110 THERAPEUTIC EXERCISES: CPT | Performed by: PHYSICAL THERAPIST

## 2024-02-21 PROBLEM — Z00.00 WELL ADULT EXAM: Status: RESOLVED | Noted: 2024-02-07 | Resolved: 2024-02-21

## 2024-02-21 PROBLEM — Z11.1 TUBERCULOSIS SCREENING: Status: RESOLVED | Noted: 2024-02-07 | Resolved: 2024-02-21

## 2024-06-05 ENCOUNTER — APPOINTMENT (OUTPATIENT)
Dept: RADIOLOGY | Facility: AMBULARY SURGERY CENTER | Age: 24
End: 2024-06-05
Attending: STUDENT IN AN ORGANIZED HEALTH CARE EDUCATION/TRAINING PROGRAM
Payer: COMMERCIAL

## 2024-06-05 ENCOUNTER — OFFICE VISIT (OUTPATIENT)
Dept: OBGYN CLINIC | Facility: CLINIC | Age: 24
End: 2024-06-05
Payer: COMMERCIAL

## 2024-06-05 VITALS
BODY MASS INDEX: 26 KG/M2 | SYSTOLIC BLOOD PRESSURE: 151 MMHG | WEIGHT: 220.2 LBS | HEIGHT: 77 IN | RESPIRATION RATE: 18 BRPM | DIASTOLIC BLOOD PRESSURE: 92 MMHG | HEART RATE: 58 BPM

## 2024-06-05 DIAGNOSIS — M23.91 ACUTE INTERNAL DERANGEMENT OF RIGHT KNEE: Primary | ICD-10-CM

## 2024-06-05 DIAGNOSIS — M25.561 RIGHT KNEE PAIN, UNSPECIFIED CHRONICITY: ICD-10-CM

## 2024-06-05 DIAGNOSIS — M25.561 ACUTE PAIN OF RIGHT KNEE: ICD-10-CM

## 2024-06-05 PROCEDURE — 99214 OFFICE O/P EST MOD 30 MIN: CPT | Performed by: STUDENT IN AN ORGANIZED HEALTH CARE EDUCATION/TRAINING PROGRAM

## 2024-06-05 PROCEDURE — 73564 X-RAY EXAM KNEE 4 OR MORE: CPT

## 2024-06-05 NOTE — PROGRESS NOTES
Ortho Sports Medicine Knee New Patient Visit     Assesment:   23 y.o. male right knee internal derangement after patellar instability episode    Plan:  The patient's diagnosis and treatment were discussed at length today. We discussed no treatment, non-operative treatment, and operative treatment.    Zain presents today for consultation of acute right knee pain referred to me by his prior physical therapist Jett Nagel. The patient sustained an injury while playing first base on when reaching for a ball he felt his patella dislocate. He and another player witnessed and he had to push back to relocate on June 2nd, 2024. He presents with large joint effusion and sensitivity at the MPFL origin and medial pole of patella. He was guarding on exam to get a good read on a Lachman's. I ordered a MRI of the right knee to evaluate for cartilage damage and ligamentous injury. Bhavana Evelin will schedule. He may use ice and OTC medication for pain as needed. I provided him with a yash-pull knee brace. I provided him with a PT script for knee ROM to begin after his MRI review. He should follow-up with me after his MRI for further evaluation and treatment.    Conservative treatment:    Ice to knee for 20 minutes at least 1-2 times daily.  OTC NSAIDS prn for pain.  MRI right knee ordered to evaluate cartilage and ligaments.      Imaging:    All imaging from today was reviewed by myself and explained to the patient.       Injection:    No Injection planned at this time.      Surgery:     No surgery is recommended at this point, continue with conservative treatment plan as noted.      Follow up:    Return for Recheck after MRI with Dr. Graf.        Chief Complaint   Patient presents with    Right Knee - Pain       History of Present Illness:    The patient is a 23 y.o. 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      Pain is located diffuse.  The patient rates the pain as a 7/10.  The pain has been present for 4 days.      The patient sustained an injury on 06/02/2024.  The mechanism of injury was playing first base in an adult baseball league, reaching for a ball and felt his patella dislocate medially he had to push back in. The pain is characterized as dull, achy.  The pain is present daily.      Pain is improved by rest and Advil prn.  Pain is aggravated by weight bearing and knee ROM.    Symptoms include swelling.     The patient has tried rest and Advil.    Patient denies any prior right knee injuries or patellar dislocations.      Knee Surgical History:  None    Past Medical, Social and Family History:  Past Medical History:   Diagnosis Date    Fractures     stress fx of Right foot and toe fx  : Pinkie finger right FX    Motion sickness      Past Surgical History:   Procedure Laterality Date    CYST REMOVAL      upper Gum     FL INJECTION RIGHT ELBOW (ARTHROGRAM)  12/16/2019    FL INJECTION RIGHT SHOULDER (ARTHROGRAM)  9/24/2021    FL INJECTION RIGHT SHOULDER (ARTHROGRAM)  7/24/2023    NE SURGICAL ARTHROSCOPY SHOULDER CAPSULORRHAPHY Left 9/12/2018    Procedure: SHOULDER ARTHROSCOPY WITH LABRAL POSTERIOR AND ANTERIOR REPAIR;  Surgeon: Antonio Ladd MD;  [facility];  Service: Orthopedics    NE SURGICAL ARTHROSCOPY SHOULDER REPAIR SLAP LESION Right 9/6/2023    Procedure: SHOULDER ARTHROSCOPY WITH SLAP REPAIR AND POSTERIOR LABRAL REPAIR;  Surgeon: Antonio Ladd MD;  [facility];  Service: Orthopedics     No Known Allergies  Current Outpatient Medications on File Prior to Visit   Medication Sig Dispense Refill    sertraline (Zoloft) 100 mg tablet Take 1 tablet (100 mg total) by mouth daily 90 tablet 1    triamcinolone (KENALOG) 0.5 % cream Apply topically 2 (two) times a day (Patient not taking: Reported on 12/18/2023) 15 g 2     No current facility-administered  "medications on file prior to visit.     Social History     Socioeconomic History    Marital status: Single     Spouse name: Not on file    Number of children: Not on file    Years of education: Not on file    Highest education level: Not on file   Occupational History    Not on file   Tobacco Use    Smoking status: Never    Smokeless tobacco: Never   Vaping Use    Vaping status: Never Used   Substance and Sexual Activity    Alcohol use: Yes    Drug use: No    Sexual activity: Not Currently   Other Topics Concern    Not on file   Social History Narrative    Not on file     Social Determinants of Health     Financial Resource Strain: Not on file   Food Insecurity: Not on file   Transportation Needs: Not on file   Physical Activity: Not on file   Stress: Not on file   Social Connections: Not on file   Intimate Partner Violence: Not on file   Housing Stability: Not on file         I have reviewed the past medical, surgical, social and family history, medications and allergies as documented in the EMR.    Review of systems: ROS is negative other than that noted in the HPI.  Constitutional: Negative for fatigue and fever.   HENT: Negative for sore throat.    Respiratory: Negative for shortness of breath.    Cardiovascular: Negative for chest pain.   Gastrointestinal: Negative for abdominal pain.   Endocrine: Negative for cold intolerance and heat intolerance.   Genitourinary: Negative for flank pain.   Musculoskeletal: Negative for back pain.   Skin: Negative for rash.   Allergic/Immunologic: Negative for immunocompromised state.   Neurological: Negative for dizziness.   Psychiatric/Behavioral: Negative for agitation.      Physical Exam:    Blood pressure 151/92, pulse 58, resp. rate 18, height 6' 5\" (1.956 m), weight 99.9 kg (220 lb 3.2 oz).    General/Constitutional: NAD, well developed, well nourished  HENT: Normocephalic, atraumatic  CV: Intact distal pulses, regular rate  Resp: No respiratory distress or labored " breathing  Lymphatic: No lymphadenopathy palpated  Neuro: Alert and Oriented x 3, no focal deficits  Psych: Normal mood, normal affect, normal judgement, normal behavior  Skin: Warm, dry, no rashes, no erythema      Knee Exam (focused):  Visual inspection of the RIGHT knee demonstrates normal contour without atrophy.   No previous incisions   No significant erythema or edema.    Large joint effusion   Range of motion is limited from 0-45 degrees of flexion   Able to straight leg raise   Medial facet patella and MPFL origin tender to palpation. No tenderness over tibial side MCL.  Positive medial joint line tenderness, Negative lateral joint line tenderness  Unable to test McMurrarys due to pain  Guarding with Lachman exam, Stable posterior drawer  Stable to varus and valgus stress at both 0 and 30°  Patella tracks normally.  No J sign.  No apprehension.  Translation is approximately 2 quadrants and is equal to the contralateral side  Patellar eversion is similar to the contralateral side    Examination of the patient's ipsilateral hip demonstrates full painless range of motion.  No crepitus.      LE NV Exam: +2 DP/PT pulses bilaterally  Sensation intact to light touch L2-S1 bilaterally     Bilateral hip ROM demonstrates no pain actively or passively    No calf tenderness to palpation bilaterally    Knee Imaging    X-rays of the right knee from today were reviewed, which demonstrate no acute fracture or dislocation. Large joint effusion. No significant degenerative changes.  I have reviewed the radiology report and agree with their impression.      Scribe Attestation      I,:  Mellissa Burgos am acting as a scribe while in the presence of the attending physician.:       I,:  Jaxon Graf, DO personally performed the services described in this documentation    as scribed in my presence.:

## 2024-06-06 ENCOUNTER — HOSPITAL ENCOUNTER (OUTPATIENT)
Dept: MRI IMAGING | Facility: HOSPITAL | Age: 24
End: 2024-06-06
Attending: STUDENT IN AN ORGANIZED HEALTH CARE EDUCATION/TRAINING PROGRAM
Payer: COMMERCIAL

## 2024-06-06 DIAGNOSIS — M23.91 ACUTE INTERNAL DERANGEMENT OF RIGHT KNEE: ICD-10-CM

## 2024-06-06 PROCEDURE — 73721 MRI JNT OF LWR EXTRE W/O DYE: CPT

## 2024-06-12 ENCOUNTER — OFFICE VISIT (OUTPATIENT)
Dept: OBGYN CLINIC | Facility: CLINIC | Age: 24
End: 2024-06-12
Payer: COMMERCIAL

## 2024-06-12 VITALS
HEART RATE: 53 BPM | DIASTOLIC BLOOD PRESSURE: 87 MMHG | BODY MASS INDEX: 26.01 KG/M2 | HEIGHT: 77 IN | WEIGHT: 220.3 LBS | RESPIRATION RATE: 18 BRPM | SYSTOLIC BLOOD PRESSURE: 132 MMHG

## 2024-06-12 DIAGNOSIS — M25.361 PATELLAR INSTABILITY OF RIGHT KNEE: Primary | ICD-10-CM

## 2024-06-12 PROCEDURE — 99214 OFFICE O/P EST MOD 30 MIN: CPT | Performed by: STUDENT IN AN ORGANIZED HEALTH CARE EDUCATION/TRAINING PROGRAM

## 2024-06-12 NOTE — PROGRESS NOTES
Ortho Sports Medicine Knee Follow Up Visit     Assesment:     23 y.o. male right knee single patella dislocation event    Plan:  The patient's diagnosis and treatment were discussed at length today. We discussed no treatment, non-operative treatment, and operative treatment.    Kristian presents today for follow-up evaluation right knee single patella dislocation event and MRI review.  I did review his MRI with him at today's which demonstrates high-grade partial versus complete tear of MPFL from the femoral side.  Given that he has undergone a single patella dislocation event without any recurrent instability, we collectively agreed to proceed with nonsurgical intervention.  I recommended continued use of his Flaco pull.  We discussed risk factors for recurrent instability and he has no evidence of elevated TT/TG or any trochlear dysplasia.  There is also no acute cartilaginous injuries.  I did also provide him with a referral to outpatient physical therapy to begin range of motion and strengthening.  I would also like to focus on swelling control as his motion is limited during today's examination secondary to swelling.  He can continue ice and over-the-counter medication as needed for pain relief.  He is to follow-up in approximately 6 weeks for reevaluation.                                         Conservative treatment:    Ice to knee for 20 minutes at least 1-2 times daily.  PT for ROM/strengthening to knee, hip and core.  OTC NSAIDS prn for pain.  Continue use of flaco-pull    Imaging:    All imaging from today was reviewed by myself and explained to the patient.       Injection:    No Injection planned at this time.      Surgery:     No surgery is recommended at this point, continue with conservative treatment plan as noted.    Follow up:    No follow-ups on file.        Chief Complaint   Patient presents with    Right Knee - Follow-up     MRI Review       History of Present Illness:    The patient is returns for  follow up of right knee single patella dislocation event.  He states that he is overall doing well at this time and denies any recurrent instability.  He does still report some stiffness and swelling.  He also reports occasional pain along the medial aspect of his knee which he describes as dull and achy and rates a 2 out of 10.  He has been compliant with use of his Flaco pull brace.  He has not yet returned to outpatient physical therapy.  He denies any mechanical symptoms or catching or locking.  He denies any new injury or trauma to his knee.        Knee Surgical History:  None    Past Medical, Social and Family History:  Past Medical History:   Diagnosis Date    Fractures     stress fx of Right foot and toe fx  : Pinkie finger right FX    Motion sickness      Past Surgical History:   Procedure Laterality Date    CYST REMOVAL      upper Gum     FL INJECTION RIGHT ELBOW (ARTHROGRAM)  12/16/2019    FL INJECTION RIGHT SHOULDER (ARTHROGRAM)  9/24/2021    FL INJECTION RIGHT SHOULDER (ARTHROGRAM)  7/24/2023    [place] SURGICAL ARTHROSCOPY SHOULDER CAPSULORRHAPHY Left 9/12/2018    Procedure: SHOULDER ARTHROSCOPY WITH LABRAL POSTERIOR AND ANTERIOR REPAIR;  Surgeon: Antonio Ladd MD;  Location: [facility]  MAIN [facility];  Service: Orthopedics    [place] SURGICAL ARTHROSCOPY SHOULDER REPAIR SLAP LESION Right 9/6/2023    Procedure: SHOULDER ARTHROSCOPY WITH SLAP REPAIR AND POSTERIOR LABRAL REPAIR;  Surgeon: Antonio Ladd MD;  Location: [facility] MA[facility] OR;  Service: Orthopedics     No Known Allergies  Current Outpatient Medications on File Prior to Visit   Medication Sig Dispense Refill    sertraline (Zoloft) 100 mg tablet Take 1 tablet (100 mg total) by mouth daily 90 tablet 1    triamcinolone (KENALOG) 0.5 % cream Apply topically 2 (two) times a day (Patient not taking: Reported on 12/18/2023) 15 g 2     No current facility-administered medications on file prior to visit.     Social History     Socioeconomic History    Marital status:  "Single     Spouse name: Not on file    Number of children: Not on file    Years of education: Not on file    Highest education level: Not on file   Occupational History    Not on file   Tobacco Use    Smoking status: Never    Smokeless tobacco: Never   Vaping Use    Vaping status: Never Used   Substance and Sexual Activity    Alcohol use: Yes    Drug use: No    Sexual activity: Not Currently   Other Topics Concern    Not on file   Social History Narrative    Not on file     Social Determinants of Health     Financial Resource Strain: Not on file   Food Insecurity: Not on file   Transportation Needs: Not on file   Physical Activity: Not on file   Stress: Not on file   Social Connections: Not on file   Intimate Partner Violence: Not on file   Housing Stability: Not on file         I have reviewed the past medical, surgical, social and family history, medications and allergies as documented in the EMR.    Review of systems: ROS is negative other than that noted in the HPI.  Constitutional: Negative for fatigue and fever.      Physical Exam:    Blood pressure 132/87, pulse (!) 53, resp. rate 18, height 6' 5\" (1.956 m), weight 99.9 kg (220 lb 4.8 oz).    General/Constitutional: NAD, well developed, well nourished  HENT: Normocephalic, atraumatic  CV: Intact distal pulses, regular rate  Resp: No respiratory distress or labored breathing  Lymphatic: No lymphadenopathy palpated  Neuro: Alert and Oriented x 3, no focal deficits  Psych: Normal mood, normal affect, normal judgement, normal behavior  Skin: Warm, dry, no rashes, no erythema      Knee Exam (focused):  Visual inspection of the Right knee demonstrates normal contour without atrophy.   No previous incisions   There is no significant erythema or edema.    Mild joint effusion  Range of motion is limited 0-100 with pain  Able to straight leg raise   Minimal medial pole of patella and MPFL origin tender to palpation  - medial joint line tenderness, - lateral joint line " tenderness  - medial Carmen's, - lateral Carmen's  1A Lachman exam, stable posterior drawer  - dial test  Stable to varus and valgus stress at both 0 and 30°  Patella tracks normally.  No J sign.  No apprehension.  Translation is approximately 2 quadrants and is equal to the contralateral side  Patellar eversion is similar to the contralateral side    Examination of the patient's ipsilateral hip demonstrates full painless range of motion.  No crepitus.           LE NV Exam: +2 DP/PT pulses bilaterally  Sensation intact to light touch L2-S1 bilaterally    No calf tenderness to palpation bilaterally      Knee Imaging    MRI of right knee was reviewed which demonstrates recent lateral patellar dislocation with high-grade partial versus complete tear of MPFL.  Patella also noted with normal TT TG.  No evidence of osteochondral defects.  I have reviewed and agree with the radiologist interpretation.      Scribe Attestation      I,:  Moiz Botello am acting as a scribe while in the presence of the attending physician.:       I,:  Jaxon Graf, DO personally performed the services described in this documentation    as scribed in my presence.:

## 2024-06-17 ENCOUNTER — EVALUATION (OUTPATIENT)
Dept: PHYSICAL THERAPY | Facility: REHABILITATION | Age: 24
End: 2024-06-17
Payer: COMMERCIAL

## 2024-06-17 DIAGNOSIS — M25.361 PATELLAR INSTABILITY OF RIGHT KNEE: ICD-10-CM

## 2024-06-17 PROCEDURE — 97162 PT EVAL MOD COMPLEX 30 MIN: CPT | Performed by: PHYSICAL THERAPIST

## 2024-06-17 PROCEDURE — 97112 NEUROMUSCULAR REEDUCATION: CPT | Performed by: PHYSICAL THERAPIST

## 2024-06-17 NOTE — PROGRESS NOTES
PT Evaluation     Today's date: 2024  Patient name: Zain Saab  : 2000  MRN: [id number]  Referring provider: Jaxon Graf DO  Dx:   Encounter Diagnosis     ICD-10-CM    1. Patellar instability of right knee  M25.361 Ambulatory Referral to Physical Therapy          Start Time: 07  Stop Time: 0750  Total time in clinic (min): 45 minutes    Assessment  Impairments: abnormal gait, abnormal muscle firing, abnormal muscle tone, abnormal or restricted ROM, abnormal movement, activity intolerance, impaired physical strength and pain with function    Assessment details: Zain Saab is a 23 y.o. male presenting to outpatient physical therapy on 24 with referral from MD for . Upon evaluation, Zain demonstrates impaired knee ROM, strength and pain with resulting functional deficits. The listed impairments and functional limitation are effecting Zain ability to function at prior level. They can continue to benefit from physical therapy services at this times in order to address the above discussed impairments and functional limitation in order to allow for a return to premorbid status     Understanding of Dx/Px/POC: good     Prognosis: good    Plan  Patient would benefit from: skilled PT  Planned modality interventions: thermotherapy: hydrocollator packs    Planned therapy interventions: joint mobilization, manual therapy, ADL training, balance, balance/weight bearing training, neuromuscular re-education, home exercise program, therapeutic exercise, therapeutic activities, strengthening, patient education, functional ROM exercises and gait training    Frequency: 2x week  Duration in weeks: 10  Treatment plan discussed with: patient        Subjective Evaluation    History of Present Illness  Mechanism of injury: Zain is a 23 y.o. male presenting to physical therapy on 24 with referral from MD for after knee injury on 24. Per MD noted: The patient  sustained an injury while playing first base on when reaching for a ball he felt his patella dislocate. He and another player witnessed and he had to push back to relocate on 2024.    He did have an MRI which reveal an partial grade MPFL tear.     He is wearing a brace that helps with pain, feeling of stability and function. Steps are difficulty and movements that load the knee are challenging.                   Not a recurrent problem   Quality of life: good    Patient Goals  Patient goals for therapy: decreased pain and return to sport/leisure activities    Pain  Current pain ratin  At worst pain ratin  Location: Medial knee, top of knee  Quality: dull ache      Diagnostic Tests  MRI studies: abnormal  Treatments  No previous or current treatments    Short Term Goals:   1. Patient will be Independent with hep  2. Patient will improve pain with activity by 50%  3. Patient will report GROC 50% or greater  4. Patient will perform SLR x20 without lag    Long Term Goals:   1. Patient will improve FOTO to greater then goal  2. Patient will improve pain with activity to 2/10 or less  3. Patient will continue with HEP independence to allow for decreased future reoccurrence of pain and loss in function  4. Patient will report GROC 75% or greater  5. Patient will have normalized knee ROM  6. Patient will squat pfree  7. Patient will run pfree  8. Patient will return to baseball pfree without limitation  9. Patient will perform LSD without pain or limitation 3x    Objective         GAIT: patellar brace, mild decrease in knee flexion during swing  Squat assess: 25% pain    Edema: 42 cm joint line  Quad: fair, decreased activation vs Left knee with mild atrophy.   SLR: 10x          MMT         AROM          PROM    Hip       L       R        L           R      L     R   Flex.         Extn.         Abd.         Add.         IR.         ER.         G. Max         G. Med         Iliop.         .         Knee          Extension  3-/5    +5   Flexion  4 P    100            Ankle         Dorsi Flexion         Plantar Flexion              Segmental mobility:   Patella:  mild hypermobility                    Precautions: protocol per MD      Manuals                                                                 Neuro Re-Ed                                                                                                        Ther Ex             Bike 5'            SLR + stim            90/60 MAGO + stim            HEP review 5'                                                                Ther Activity                                       Gait Training                                       Modalities

## 2024-06-20 ENCOUNTER — OFFICE VISIT (OUTPATIENT)
Dept: PHYSICAL THERAPY | Facility: REHABILITATION | Age: 24
End: 2024-06-20
Payer: COMMERCIAL

## 2024-06-20 DIAGNOSIS — M25.361 PATELLAR INSTABILITY OF RIGHT KNEE: Primary | ICD-10-CM

## 2024-06-20 PROCEDURE — 97112 NEUROMUSCULAR REEDUCATION: CPT | Performed by: PHYSICAL THERAPIST

## 2024-06-20 PROCEDURE — 97110 THERAPEUTIC EXERCISES: CPT | Performed by: PHYSICAL THERAPIST

## 2024-06-20 NOTE — PROGRESS NOTES
"Daily Note     Today's date: 2024  Patient name: Zain Saab  : 2000  MRN: [id number]  Referring provider: Jaxon Graf DO  Dx:   Encounter Diagnosis     ICD-10-CM    1. Patellar instability of right knee  M25.361           Start Time: 1115  Stop Time: 1205  Total time in clinic (min): 50 minutes    Subjective: Patient reports doing well today. Was having some sharp pains when walking and on feet a lot at a baseball game Tuesday.       Objective: See treatment diary below  ROM: 0-140 deg    Assessment: Tolerated treatment well. Patient is progressing well with ROM and strength at this time as he had fair + quad activation. Performs 10-12 reps SLR without lag. Will continue with quad re-ed, loading as tolerated.      Plan: Continue per plan of care.      Precautions: protocol per MD      Manuals                                                                 Neuro Re-Ed             SLS  30\"x3 EC           Balance board  No assist-AP/ML 2' ea                                                                            Ther Ex             Bike 5' 5'           SLR + stim + stim   5 rds           LAQ  + stim  10#  5 rds           HEP review 5'            QS  + stim  3 rds 15\"           Leg press  100#  4x8           SL HR  3x10                        Ther Activity                                       Gait Training                                       Modalities                                            "

## 2024-06-24 ENCOUNTER — APPOINTMENT (OUTPATIENT)
Dept: PHYSICAL THERAPY | Facility: REHABILITATION | Age: 24
End: 2024-06-24
Payer: COMMERCIAL

## 2024-06-27 NOTE — PROGRESS NOTES
"Daily Note     Today's date: 2024  Patient name: Zain Saab  : 2000  MRN: [id number]  Referring provider: Jaxon Graf DO  Dx:   Encounter Diagnosis     ICD-10-CM    1. Patellar instability of right knee  M25.361           Start Time: 07  Stop Time: 0830  Total time in clinic (min): 45 minutes    Subjective: Patient reports that he has been feeling well. Pain has been well controlled.       Objective: See treatment diary below  ROM: WNL  Quad: fair +    Assessment: Tolerated treatment well. Patient progressing very well with quad strength and function as evident upon exercise progressions below. He will continue with HEP and perform updates as discussed with squatting, leg press and isolated knee ext for HEP.       Plan: Continue per plan of care.      Precautions: protocol per MD      Manuals                                                                 Neuro Re-Ed             SLS  30\"x3 EC 30\"x3  EC          Balance board  No assist-AP/ML 2' ea AP/ML  2' ea          Sneaky lunge   3xF          Wall sit   3xF          Knee ext mach   Ecc - 35#  3x8                                    Ther Ex            Bike 5' 5'           SLR + stim + stim   5 rds 3x10          LAQ  + stim  10#  5 rds           HEP review 5'            QS  + stim  3 rds 15\"           Leg press  100#  4x8           SL HR  3x10 3x15                       Ther Activity                                       Gait Training                                       Modalities                                              "

## 2024-06-28 ENCOUNTER — OFFICE VISIT (OUTPATIENT)
Dept: PHYSICAL THERAPY | Facility: REHABILITATION | Age: 24
End: 2024-06-28
Payer: COMMERCIAL

## 2024-06-28 DIAGNOSIS — M25.361 PATELLAR INSTABILITY OF RIGHT KNEE: Primary | ICD-10-CM

## 2024-06-28 PROCEDURE — 97112 NEUROMUSCULAR REEDUCATION: CPT | Performed by: PHYSICAL THERAPIST

## 2024-07-01 ENCOUNTER — OFFICE VISIT (OUTPATIENT)
Dept: PHYSICAL THERAPY | Facility: REHABILITATION | Age: 24
End: 2024-07-01
Payer: COMMERCIAL

## 2024-07-01 DIAGNOSIS — M25.361 PATELLAR INSTABILITY OF RIGHT KNEE: Primary | ICD-10-CM

## 2024-07-01 PROCEDURE — 97112 NEUROMUSCULAR REEDUCATION: CPT | Performed by: PHYSICAL THERAPIST

## 2024-07-01 PROCEDURE — 97110 THERAPEUTIC EXERCISES: CPT | Performed by: PHYSICAL THERAPIST

## 2024-07-01 NOTE — PROGRESS NOTES
"Daily Note     Today's date: 2024  Patient name: Zain Saab  : 2000  MRN: [id number]  Referring provider: Jaxon Graf DO  Dx:   Encounter Diagnosis     ICD-10-CM    1. Patellar instability of right knee  M25.361           Start Time: 0700  Stop Time: 07  Total time in clinic (min): 41 minutes    Subjective: Patient reports that he felt well after last PT session, as not overly sore.       Objective: See treatment diary below      Assessment: Tolerated treatment well. Patient progressed with knee ext through range both conc/ecc as he had no increases in soreness. Will continue to load knee as tolerated with gains in quad and functional capacity. ROM WNL.      Plan: Continue per plan of care.      Precautions: protocol per MD      Manuals                                                                 Neuro Re-Ed            SLS  30\"x3 EC 30\"x3  EC 30\"x4 EC  FOAM         Balance board  No assist-AP/ML 2' ea AP/ML  2' ea AP/ML 2' ea         Sneaky lunge   3xF 3xF         Wall sit   3xF 3xF         Knee ext mach   Ecc - 35#  3x8 Ecc - 35#  2x8  3x8 concentric                                   Ther Ex           Bike 5' 5'           SLR + stim + stim   5 rds 3x10 3x10         LAQ  + stim  10#  5 rds           HEP review 5'            QS  + stim  3 rds 15\"           Leg press  100#  4x8           SL HR  3x10 3x15 3x15                      Ther Activity                                       Gait Training                                       Modalities                                                "

## 2024-07-05 ENCOUNTER — OFFICE VISIT (OUTPATIENT)
Dept: PHYSICAL THERAPY | Facility: REHABILITATION | Age: 24
End: 2024-07-05
Payer: COMMERCIAL

## 2024-07-05 DIAGNOSIS — M25.361 PATELLAR INSTABILITY OF RIGHT KNEE: Primary | ICD-10-CM

## 2024-07-05 PROCEDURE — 97110 THERAPEUTIC EXERCISES: CPT | Performed by: PHYSICAL THERAPIST

## 2024-07-05 PROCEDURE — 97112 NEUROMUSCULAR REEDUCATION: CPT | Performed by: PHYSICAL THERAPIST

## 2024-07-05 NOTE — PROGRESS NOTES
"Daily Note     Today's date: 2024  Patient name: Zain Saab  : 2000  MRN: [id number]  Referring provider: Jaxon Graf DO  Dx:   Encounter Diagnosis     ICD-10-CM    1. Patellar instability of right knee  M25.361           Start Time: 745  Stop Time: 845  Total time in clinic (min): 60 minutes    Subjective: Patient reports that he has been feeling good. No complaints at this time.       Objective: See treatment diary below      Assessment: Tolerated treatment well. Patient did well with added light agility work today but is not ready for AP hops as he did have some pain the resolved during exercise of AP hopping. Loading continues to go well as he was squatting 100# pfree.       Plan: Continue per plan of care.      Precautions: protocol per MD      Manuals                                                                 Neuro Re-Ed           SLS  30\"x3 EC 30\"x3  EC 30\"x4 EC  FOAM With ball toss  3x20        Balance board  No assist-AP/ML 2' ea AP/ML  2' ea AP/ML 2' ea         Sneaky lunge   3xF 3xF 3xF        Wall sit   3xF 3xF DC        Knee ext mach   Ecc - 35#  3x8 Ecc - 35#  2x8  3x8 concentric Conc through range - 35#  3x8        Ladder drills     AP, lateral, icky and hops in place        Retro stepping - anson     50#  10x        Ther Ex          Bike 5' 5'   Upriht 5'  L3        SLR + stim + stim   5 rds 3x10 3x10         LAQ  + stim  10#  5 rds           HEP review 5'            QS  + stim  3 rds 15\"           Leg press  100#  4x8           SL HR  3x10 3x15 3x15         Squat -trap bar     75#  10\"x3  MAGO    3x8 through range    105#  2x8 through range        Ther Activity                                       Gait Training                                       Modalities                                                  "

## 2024-07-08 ENCOUNTER — APPOINTMENT (OUTPATIENT)
Dept: PHYSICAL THERAPY | Facility: REHABILITATION | Age: 24
End: 2024-07-08
Payer: COMMERCIAL

## 2024-07-15 ENCOUNTER — OFFICE VISIT (OUTPATIENT)
Dept: PHYSICAL THERAPY | Facility: REHABILITATION | Age: 24
End: 2024-07-15
Payer: COMMERCIAL

## 2024-07-15 DIAGNOSIS — M25.361 PATELLAR INSTABILITY OF RIGHT KNEE: Primary | ICD-10-CM

## 2024-07-15 PROCEDURE — 97110 THERAPEUTIC EXERCISES: CPT | Performed by: PHYSICAL THERAPIST

## 2024-07-15 PROCEDURE — 97112 NEUROMUSCULAR REEDUCATION: CPT | Performed by: PHYSICAL THERAPIST

## 2024-07-15 NOTE — PROGRESS NOTES
"Daily Note     Today's date: 7/15/2024  Patient name: Zain Saab  : 2000  MRN: [id number]  Referring provider: Jaxon Graf DO  Dx:   Encounter Diagnosis     ICD-10-CM    1. Patellar instability of right knee  M25.361           Start Time: 1615  Stop Time: 1710  Total time in clinic (min): 55 minutes    Subjective: Patient reports doing well. No issues/complaints.       Objective: See treatment diary below  ROM WNL  Pain: 0/10    Assessment: Tolerated treatment well. Patient demonstrated fatigue post treatment and exhibited good technique with therapeutic exercises.     He is progressing very well. His SL quad strength has improved nicely with ability to SL squat with 25#.     Performs jogging pfree, hopping pfree      Plan: Continue per plan of care.      Precautions: protocol per MD      Manuals                                                                 Neuro Re-Ed   6/28 7/1 7/5 7/15       SLS  30\"x3 EC 30\"x3  EC 30\"x4 EC  FOAM With ball toss  3x20 With ball toss  3x20       Balance board  No assist-AP/ML 2' ea AP/ML  2' ea AP/ML 2' ea         Sneaky lunge   3xF 3xF 3xF 3xF 20#       Wall sit   3xF 3xF DC        Knee ext mach   Ecc - 35#  3x8 Ecc - 35#  2x8  3x8 concentric Conc through range - 35#  3x8 Conc through range - 35#  3x8       Ladder drills     AP, lateral, icky and hops in place AP, lateral, icky and hops in place       Retro stepping - anson     50#  10x 40#  20x       Ther Ex  6/20 6/28 7/1 7/5 7/15       Bike 5' 5'   Upriht 5'  L3 Upright  5' L3       Bulg split squat      25#  4x8                    LAQ  + stim  10#  5 rds           HEP review 5'            QS  + stim  3 rds 15\"           Leg press  100#  4x8           SL HR  3x10 3x15 3x15         HS sliders      Ecc - DL  3x10       Squat -trap bar     75#  10\"x3  MAGO    3x8 through range    105#  2x8 through range NP       Ther Activity                                       Gait Training                        "                Modalities

## 2024-07-16 NOTE — PROGRESS NOTES
"Daily Note     Today's date: 2024  Patient name: Zain Saab  : 2000  MRN: [id number]  Referring provider: Jaxon Graf,   Dx: No diagnosis found.               Subjective: Patient reports doing well. Was cleared by MD for RTS.      Objective: See treatment diary below      Assessment: Tolerated treatment well. Patient performs all exercises pfree with exception of simulated first base catches. Once cue to pivot foot, strain decreased along medial knee.       Plan: Continue per plan of care.  Patient to work on base running on own.      Precautions: protocol per MD      Manuals                                                                 Neuro Re-Ed   6/28 7/1 7/5 7/15 7/17      SLS  30\"x3 EC 30\"x3  EC 30\"x4 EC  FOAM With ball toss  3x20 With ball toss  3x20 With tri color bar toss      Sim catches at 1st base (stretch lunge)       3 way  2x10      Balance board  No assist-AP/ML 2' ea AP/ML  2' ea AP/ML 2' ea         Sneaky lunge   3xF 3xF 3xF 3xF 20# 3xF 20#      Wall sit   3xF 3xF DC        Knee ext mach   Ecc - 35#  3x8 Ecc - 35#  2x8  3x8 concentric Conc through range - 35#  3x8 Conc through range - 35#  3x8 Conc through range - 35#  3x8      Ladder drills     AP, lateral, icky and hops in place AP, lateral, icky and hops in place AP, lateral, icky and hops in place      Retro stepping - anson     50#  10x 40#  20x 40#  20x      Ther Ex  6/20 6/28 7/1 7/5 7/15       Bike 5' 5'   Upriht 5'  L3 Upright  5' L3 Upright  5' L3      Bulg split squat      25#  4x8 25#  4x8                   LAQ  + stim  10#  5 rds           HEP review 5'            QS  + stim  3 rds 15\"           Leg press  100#  4x8           SL HR  3x10 3x15 3x15         HS sliders      Ecc - DL  3x10 Ecc - DL  3x10      Squat -trap bar     75#  10\"x3  MAGO    3x8 through range    105#  2x8 through range NP       Ther Activity                                       Gait Training                                     "   Modalities

## 2024-07-17 ENCOUNTER — OFFICE VISIT (OUTPATIENT)
Dept: OBGYN CLINIC | Facility: CLINIC | Age: 24
End: 2024-07-17
Payer: COMMERCIAL

## 2024-07-17 ENCOUNTER — OFFICE VISIT (OUTPATIENT)
Dept: PHYSICAL THERAPY | Facility: REHABILITATION | Age: 24
End: 2024-07-17
Payer: COMMERCIAL

## 2024-07-17 VITALS
WEIGHT: 225.6 LBS | HEART RATE: 60 BPM | HEIGHT: 77 IN | BODY MASS INDEX: 26.64 KG/M2 | DIASTOLIC BLOOD PRESSURE: 81 MMHG | SYSTOLIC BLOOD PRESSURE: 133 MMHG

## 2024-07-17 DIAGNOSIS — M25.361 PATELLAR INSTABILITY OF RIGHT KNEE: Primary | ICD-10-CM

## 2024-07-17 PROCEDURE — 97112 NEUROMUSCULAR REEDUCATION: CPT | Performed by: PHYSICAL THERAPIST

## 2024-07-17 PROCEDURE — 97110 THERAPEUTIC EXERCISES: CPT | Performed by: PHYSICAL THERAPIST

## 2024-07-17 PROCEDURE — 99213 OFFICE O/P EST LOW 20 MIN: CPT | Performed by: STUDENT IN AN ORGANIZED HEALTH CARE EDUCATION/TRAINING PROGRAM

## 2024-07-17 NOTE — PROGRESS NOTES
Ortho Sports Medicine Knee Follow Up Visit     Assesment:     23 y.o. male right knee single patellar dislocation event    Plan:  Patient is overall doing well at today's visit.  Patient has not had any other episodes of right knee patellar dislocations. He has been able to do PT with beneficial relief. He may return to activity as tolerated and return to sports.  Patient may follow-up on an as-needed basis if he has another episode of patellar dislocation.    Conservative treatment:    Ice to knee for 20 minutes at least 1-2 times daily.  PT for ROM/strengthening to knee, hip and core.  OTC NSAIDS prn for pain.  Tylenol for pain.    Imaging:    All imaging from today was reviewed by myself and explained to the patient.       Injection:    No Injection planned at this time.      Surgery:     No surgery is recommended at this point, continue with conservative treatment plan as noted.    Follow up:    Return if symptoms worsen or fail to improve.        Chief Complaint   Patient presents with    Right Knee - Follow-up       History of Present Illness:    The patient is returns for follow up of right knee single patella dislocation event on 6/2/2024.  Since the prior visit, He reports significant improvement.  Patient states he has been able to go to physical therapy with symptomatic relief.  Patient states he continues to work on range of motion and strengthening of his right knee. He states he feels stable within the knee    Patient has not experiencing pain within his right knee.    Pain is improved by rest, ice, and physical therapy.  Pain is aggravated by squatting.        The patient has tried rest, ice, and physical therapy.          Knee Surgical History:  None    Past Medical, Social and Family History:  Past Medical History:   Diagnosis Date    Fractures     stress fx of Right foot and toe fx  : Pinkie finger right FX    Motion sickness      Past Surgical History:   Procedure Laterality Date    CYST REMOVAL       upper Gum     FL INJECTION RIGHT ELBOW (ARTHROGRAM)  12/16/2019    FL INJECTION RIGHT SHOULDER (ARTHROGRAM)  9/24/2021    FL INJECTION RIGHT SHOULDER (ARTHROGRAM)  7/24/2023    [place] SURGICAL ARTHROSCOPY SHOULDER CAPSULORRHAPHY Left 9/12/2018    Procedure: SHOULDER ARTHROSCOPY WITH LABRAL POSTERIOR AND ANTERIOR REPAIR;  Surgeon: Antonio Ladd MD;  Location: [facility];  Service: Orthopedics    [place] SURGICAL ARTHROSCOPY SHOULDER REPAIR SLAP LESION Right 9/6/2023    Procedure: SHOULDER ARTHROSCOPY WITH SLAP REPAIR AND POSTERIOR LABRAL REPAIR;  Surgeon: Antonio Ladd MD;  Location: [facility];  Service: Orthopedics     No Known Allergies  Current Outpatient Medications on File Prior to Visit   Medication Sig Dispense Refill    sertraline (Zoloft) 100 mg tablet Take 1 tablet (100 mg total) by mouth daily 90 tablet 1    triamcinolone (KENALOG) 0.5 % cream Apply topically 2 (two) times a day (Patient not taking: Reported on 12/18/2023) 15 g 2     No current facility-administered medications on file prior to visit.     Social History     Socioeconomic History    Marital status: Single     Spouse name: Not on file    Number of children: Not on file    Years of education: Not on file    Highest education level: Not on file   Occupational History    Not on file   Tobacco Use    Smoking status: Never    Smokeless tobacco: Never   Vaping Use    Vaping status: Never Used   Substance and Sexual Activity    Alcohol use: Yes    Drug use: No    Sexual activity: Not Currently   Other Topics Concern    Not on file   Social History Narrative    Not on file     Social Determinants of Health     Financial Resource Strain: Not on file   Food Insecurity: Not on file   Transportation Needs: Not on file   Physical Activity: Not on file   Stress: Not on file   Social Connections: Not on file   Intimate Partner Violence: Not on file   Housing Stability: Not on file         I have reviewed the past medical, surgical, social  "and family history, medications and allergies as documented in the EMR.    Review of systems: ROS is negative other than that noted in the HPI.  Constitutional: Negative for fatigue and fever.      Physical Exam:    Blood pressure 133/81, pulse 60, height 6' 5\" (1.956 m), weight 102 kg (225 lb 9.6 oz).    General/Constitutional: NAD, well developed, well nourished  HENT: Normocephalic, atraumatic  CV: Intact distal pulses, regular rate  Resp: No respiratory distress or labored breathing  Lymphatic: No lymphadenopathy palpated  Neuro: Alert and Oriented x 3, no focal deficits  Psych: Normal mood, normal affect, normal judgement, normal behavior  Skin: Warm, dry, no rashes, no erythema      Knee Exam (focused):  Visual inspection of the right knee demonstrates normal contour without atrophy.   no previous incisions   There is no significant erythema or edema.    No significant joint effusion   Range of motion is full from 0-130 degrees of flexion   Able to straight leg raise   no medial pole of patella and MPFL origin tender to palpation  - medial joint line tenderness, - lateral joint line tenderness  - medial Carmen's, - lateral Carmen's  1A Lachman exam, stable posterior drawer  - dial test  Stable to varus and valgus stress at both 0 and 30°  Patella tracks normally.  No J sign.  No apprehension.  Translation is approximately 2 quadrants and is equal to the contralateral side  Patellar eversion is similar to the contralateral side    Examination of the patient's ipsilateral hip demonstrates full painless range of motion.  No crepitus.           LE NV Exam: +2 DP/PT pulses bilaterally  Sensation intact to light touch L2-S1 bilaterally    No calf tenderness to palpation bilaterally      Knee Imaging    No imaging was performed today      Scribe Attestation      I,:  Art Kohli am acting as a scribe while in the presence of the attending physician.:       I,:  Jaxon Graf,  personally performed the " services described in this documentation    as scribed in my presence.:

## 2024-07-22 ENCOUNTER — OFFICE VISIT (OUTPATIENT)
Dept: PHYSICAL THERAPY | Facility: REHABILITATION | Age: 24
End: 2024-07-22
Payer: COMMERCIAL

## 2024-07-22 DIAGNOSIS — M25.361 PATELLAR INSTABILITY OF RIGHT KNEE: Primary | ICD-10-CM

## 2024-07-22 PROCEDURE — 97110 THERAPEUTIC EXERCISES: CPT

## 2024-07-22 PROCEDURE — 97112 NEUROMUSCULAR REEDUCATION: CPT

## 2024-07-22 NOTE — PROGRESS NOTES
"Daily Note     Today's date: 2024  Patient name: Zain Saab  : 2000  MRN: [id number]  Referring provider: Jaxon Graf DO  Dx:   Encounter Diagnosis     ICD-10-CM    1. Patellar instability of right knee  M25.361                      Subjective: R knee feels really good, busy weekend with work.       Objective: See treatment diary below      Assessment: Tolerated treatment well. Balance and agility still need work. Early onset fatigue with single leg strengthening tasks. Continued PT would be beneficial to improve function.          Plan: Continue per plan of care.       Precautions: protocol per MD      Manuals                                                                 Neuro Re-Ed   6/28 7/1 7/5 7/15 7/17 7/22     SLS  30\"x3 EC 30\"x3  EC 30\"x4 EC  FOAM With ball toss  3x20 With ball toss  3x20 With tri color bar toss With tri color bar toss     Sim catches at 1st base (stretch lunge)       3 way  2x10 3 way 2x10     Balance board  No assist-AP/ML 2' ea AP/ML  2' ea AP/ML 2' ea         Sneaky lunge   3xF 3xF 3xF 3xF 20# 3xF 20# 3xF 20#     Wall sit   3xF 3xF DC        Knee ext mach   Ecc - 35#  3x8 Ecc - 35#  2x8  3x8 concentric Conc through range - 35#  3x8 Conc through range - 35#  3x8 Conc through range - 35#  3x8 Conc through range - 35#  3x8     Ladder drills     AP, lateral, icky and hops in place AP, lateral, icky and hops in place AP, lateral, icky and hops in place AP, lateral, icky and hops in place     Retro stepping - anson     50#  10x 40#  20x 40#  20x 40# 20x     Ther Ex  6/20 6/28 7/1 7/5 7/15       Bike 5' 5'   Upriht 5'  L3 Upright  5' L3 Upright  5' L3 Upright  5' L3     Bulg split squat      25#  4x8 25#  4x8 25# 4x8                  LAQ  + stim  10#  5 rds           HEP review 5'            QS  + stim  3 rds 15\"           Leg press  100#  4x8           SL HR  3x10 3x15 3x15         HS sliders      Ecc - DL  3x10 Ecc - DL  3x10 Ecc - DL  3x10     Squat -trap " "bar     75#  10\"x3  MAGO    3x8 through range    105#  2x8 through range NP       Ther Activity                                       Gait Training                                       Modalities                                                        "

## 2024-07-24 ENCOUNTER — APPOINTMENT (OUTPATIENT)
Dept: PHYSICAL THERAPY | Facility: REHABILITATION | Age: 24
End: 2024-07-24
Payer: COMMERCIAL

## 2024-07-29 ENCOUNTER — OFFICE VISIT (OUTPATIENT)
Dept: PHYSICAL THERAPY | Facility: REHABILITATION | Age: 24
End: 2024-07-29
Payer: COMMERCIAL

## 2024-07-29 DIAGNOSIS — M25.361 PATELLAR INSTABILITY OF RIGHT KNEE: Primary | ICD-10-CM

## 2024-07-29 PROCEDURE — 97110 THERAPEUTIC EXERCISES: CPT | Performed by: PHYSICAL THERAPIST

## 2024-07-29 PROCEDURE — 97112 NEUROMUSCULAR REEDUCATION: CPT | Performed by: PHYSICAL THERAPIST

## 2024-07-29 NOTE — PROGRESS NOTES
"Daily Note     Today's date: 2024  Patient name: Zain Saab  : 2000  MRN: [id number]  Referring provider: Jaxon Graf DO  Dx:   Encounter Diagnosis     ICD-10-CM    1. Patellar instability of right knee  M25.361           Start Time: 735  Stop Time: 0815  Total time in clinic (min): 40 minutes    Subjective: Patient reports that he has been feeling good. He plans to return to baseball play tonight.       Objective: See treatment diary below      Assessment: Tolerated treatment well. Patient performs well with exercises involving change in direction, simulated first base reaches. We discussed him getting out to the field early to work on base running.      Plan: Continue per plan of care.  Likely DC NV.     Precautions: protocol per MD      Manuals                                                                 Neuro Re-Ed   6/28 7/1 7/5 7/15 7/17 7/22 7/29    SLS  30\"x3 EC 30\"x3  EC 30\"x4 EC  FOAM With ball toss  3x20 With ball toss  3x20 With tri color bar toss With tri color bar toss     Sim catches at 1st base (stretch lunge)       3 way  2x10 3 way 2x10 3 way with sliers 10 reps ea dir    Balance board  No assist-AP/ML 2' ea AP/ML  2' ea AP/ML 2' ea         Sneaky lunge   3xF 3xF 3xF 3xF 20# 3xF 20# 3xF 20#     Wall sit   3xF 3xF DC        Knee ext mach   Ecc - 35#  3x8 Ecc - 35#  2x8  3x8 concentric Conc through range - 35#  3x8 Conc through range - 35#  3x8 Conc through range - 35#  3x8 Conc through range - 35#  3x8 Conc through range - 35#  3x8    Box jumps         12\"  DL asc/desc    8\"   SL  Asc/desc and lateral    Ladder drills     AP, lateral, icky and hops in place AP, lateral, icky and hops in place AP, lateral, icky and hops in place AP, lateral, icky and hops in place     Retro stepping - anson     50#  10x 40#  20x 40#  20x 40# 20x     Ther Ex  6/20 6/28 7/1 7/5 7/15       Bike 5' 5'   Upriht 5'  L3 Upright  5' L3 Upright  5' L3 Upright  5' L3 Upright  5' L4  " "  Bulg split squat      25#  4x8 25#  4x8 25# 4x8 253 4x8                 LAQ  + stim  10#  5 rds           HEP review 5'            QS  + stim  3 rds 15\"           Leg press  100#  4x8           SL HR  3x10 3x15 3x15         HS sliders      Ecc - DL  3x10 Ecc - DL  3x10 Ecc - DL  3x10     Squat -trap bar     75#  10\"x3  MAGO    3x8 through range    105#  2x8 through range NP       Ther Activity                                       Gait Training                                       Modalities                                                          "

## 2024-07-31 ENCOUNTER — APPOINTMENT (OUTPATIENT)
Dept: PHYSICAL THERAPY | Facility: REHABILITATION | Age: 24
End: 2024-07-31
Payer: COMMERCIAL

## 2024-08-14 DIAGNOSIS — F41.9 ANXIETY: ICD-10-CM

## 2024-08-15 RX ORDER — SERTRALINE HYDROCHLORIDE 100 MG/1
100 TABLET, FILM COATED ORAL DAILY
Qty: 30 TABLET | Refills: 0 | Status: SHIPPED | OUTPATIENT
Start: 2024-08-15

## 2024-08-15 NOTE — TELEPHONE ENCOUNTER
Patient message sent via [employer]:    Dear Zain,    Our records indicate that you are due for your 6 Month Follow-Up with Bud Wilson MD.    To achieve a high level of care, we are taking this time to remind you to schedule this visit.    For assistance with scheduling this appointment, please contact our office at [phone].    Thank you for allowing us to participate in your healthcare needs.       Sincerely,      [facility]

## 2024-08-15 NOTE — TELEPHONE ENCOUNTER
Dear Zain,      Our records indicate that you are due for your 6 Month Follow-Up with Bud Wilson MD.    To achieve a high level of care, we are taking this time to remind you to schedule this visit.    For assistance with scheduling this appointment, please contact our office at [phone].    Thank you for allowing us to participate in your healthcare needs.       Sincerely,      [facility]

## 2024-09-19 DIAGNOSIS — F41.9 ANXIETY: ICD-10-CM

## 2024-09-19 RX ORDER — SERTRALINE HYDROCHLORIDE 100 MG/1
100 TABLET, FILM COATED ORAL DAILY
Qty: 90 TABLET | Refills: 1 | Status: SHIPPED | OUTPATIENT
Start: 2024-09-19

## 2025-03-27 DIAGNOSIS — F41.9 ANXIETY: ICD-10-CM

## 2025-03-27 RX ORDER — SERTRALINE HYDROCHLORIDE 100 MG/1
100 TABLET, FILM COATED ORAL DAILY
Qty: 90 TABLET | Refills: 0 | Status: SHIPPED | OUTPATIENT
Start: 2025-03-27

## 2025-06-27 DIAGNOSIS — F41.9 ANXIETY: ICD-10-CM

## 2025-06-30 RX ORDER — SERTRALINE HYDROCHLORIDE 100 MG/1
100 TABLET, FILM COATED ORAL DAILY
Qty: 90 TABLET | Refills: 1 | Status: SHIPPED | OUTPATIENT
Start: 2025-06-30
